# Patient Record
Sex: FEMALE | Race: BLACK OR AFRICAN AMERICAN | NOT HISPANIC OR LATINO | Employment: OTHER | URBAN - METROPOLITAN AREA
[De-identification: names, ages, dates, MRNs, and addresses within clinical notes are randomized per-mention and may not be internally consistent; named-entity substitution may affect disease eponyms.]

---

## 2023-01-27 PROBLEM — G81.11: Status: ACTIVE | Noted: 2023-01-27

## 2023-01-27 PROBLEM — A59.01 TRICHOMONAS VAGINITIS: Status: ACTIVE | Noted: 2023-01-27

## 2023-01-27 PROBLEM — I82.402 ACUTE DEEP VEIN THROMBOSIS (DVT) OF LEFT LOWER EXTREMITY (MULTI): Status: ACTIVE | Noted: 2023-01-27

## 2023-01-27 PROBLEM — R32 URINE INCONTINENCE: Status: ACTIVE | Noted: 2023-01-27

## 2023-01-27 PROBLEM — M79.673 PAIN IN FOOT: Status: ACTIVE | Noted: 2023-01-27

## 2023-01-27 PROBLEM — R14.0 ABDOMINAL DISTENSION: Status: ACTIVE | Noted: 2023-01-27

## 2023-01-27 PROBLEM — N92.0 MENORRHAGIA: Status: ACTIVE | Noted: 2023-01-27

## 2023-01-27 PROBLEM — R91.1 NODULE OF RIGHT LUNG: Status: ACTIVE | Noted: 2023-01-27

## 2023-01-27 PROBLEM — I69.359 HEMIPARESIS AFFECTING DOMINANT SIDE AS LATE EFFECT OF STROKE (MULTI): Status: ACTIVE | Noted: 2023-01-27

## 2023-01-27 PROBLEM — D69.6 THROMBOCYTOPENIA (CMS-HCC): Status: ACTIVE | Noted: 2023-01-27

## 2023-01-27 PROBLEM — I69.390 APRAXIA AS LATE EFFECT OF CEREBROVASCULAR ACCIDENT (CVA): Status: ACTIVE | Noted: 2023-01-27

## 2023-01-27 PROBLEM — Z86.73 CHRONIC ISCHEMIC LEFT MCA STROKE: Status: ACTIVE | Noted: 2023-01-27

## 2023-01-27 PROBLEM — I69.920 APHASIA DUE TO LATE EFFECTS OF CEREBROVASCULAR DISEASE: Status: ACTIVE | Noted: 2023-01-27

## 2023-01-27 PROBLEM — M25.673 DECREASED ROM OF ANKLE: Status: ACTIVE | Noted: 2023-01-27

## 2023-01-27 PROBLEM — D64.9 ANEMIA: Status: ACTIVE | Noted: 2023-01-27

## 2023-01-27 PROBLEM — I69.320 APHASIA AS LATE EFFECT OF CEREBROVASCULAR ACCIDENT (CVA): Status: ACTIVE | Noted: 2023-01-27

## 2023-01-27 PROBLEM — K21.9 GERD (GASTROESOPHAGEAL REFLUX DISEASE): Status: ACTIVE | Noted: 2023-01-27

## 2023-01-27 PROBLEM — R26.2 DIFFICULTY WALKING: Status: ACTIVE | Noted: 2023-01-27

## 2023-01-27 PROBLEM — N92.0 HEAVY MENSTRUAL BLEEDING: Status: ACTIVE | Noted: 2023-01-27

## 2023-01-27 PROBLEM — N92.0 MENORRHAGIA WITH REGULAR CYCLE: Status: ACTIVE | Noted: 2023-01-27

## 2023-01-27 PROBLEM — M62.89 MUSCLE TONE INCREASED: Status: ACTIVE | Noted: 2023-01-27

## 2023-01-27 PROBLEM — I26.99 BILATERAL PULMONARY EMBOLISM (MULTI): Status: ACTIVE | Noted: 2023-01-27

## 2023-01-27 PROBLEM — R00.2 PALPITATIONS: Status: ACTIVE | Noted: 2023-01-27

## 2023-01-27 PROBLEM — F32.A DEPRESSION: Status: ACTIVE | Noted: 2023-01-27

## 2023-01-27 PROBLEM — D21.9 FIBROIDS: Status: ACTIVE | Noted: 2023-01-27

## 2023-01-27 PROBLEM — K59.00 CONSTIPATION: Status: ACTIVE | Noted: 2023-01-27

## 2023-01-27 PROBLEM — R10.9 ABDOMINAL PAIN: Status: ACTIVE | Noted: 2023-01-27

## 2023-01-27 PROBLEM — G81.91 RIGHT HEMIPLEGIA (MULTI): Status: ACTIVE | Noted: 2023-01-27

## 2023-01-27 PROBLEM — D50.9 IRON DEFICIENCY ANEMIA: Status: ACTIVE | Noted: 2023-01-27

## 2023-01-27 PROBLEM — T74.91XA DOMESTIC ABUSE OF ADULT: Status: ACTIVE | Noted: 2023-01-27

## 2023-01-27 PROBLEM — I63.9 CRYPTOGENIC STROKE (MULTI): Status: ACTIVE | Noted: 2023-01-27

## 2023-01-27 PROBLEM — J06.9 VIRAL URI WITH COUGH: Status: ACTIVE | Noted: 2023-01-27

## 2023-01-27 RX ORDER — BACLOFEN 10 MG/1
TABLET ORAL
COMMUNITY
Start: 2022-05-27 | End: 2023-05-23 | Stop reason: SDUPTHER

## 2023-01-27 RX ORDER — FERROUS SULFATE 325(65) MG
TABLET ORAL
COMMUNITY
Start: 2019-10-01 | End: 2024-03-05 | Stop reason: WASHOUT

## 2023-01-27 RX ORDER — POLYETHYLENE GLYCOL 3350 17 G/17G
POWDER, FOR SOLUTION ORAL
COMMUNITY
Start: 2021-08-02

## 2023-01-27 RX ORDER — NAPROXEN 500 MG/1
TABLET ORAL
COMMUNITY
Start: 2021-04-16 | End: 2023-11-21 | Stop reason: WASHOUT

## 2023-01-27 RX ORDER — SERTRALINE HYDROCHLORIDE 100 MG/1
TABLET, FILM COATED ORAL
COMMUNITY
Start: 2021-03-05 | End: 2023-04-26 | Stop reason: SDUPTHER

## 2023-01-27 RX ORDER — ATORVASTATIN CALCIUM 80 MG/1
1 TABLET, FILM COATED ORAL DAILY
COMMUNITY
Start: 2017-06-05 | End: 2023-08-30 | Stop reason: SDUPTHER

## 2023-01-27 RX ORDER — ZOSTER VACCINE RECOMBINANT, ADJUVANTED 50 MCG/0.5
KIT INTRAMUSCULAR
COMMUNITY
Start: 2022-03-02 | End: 2024-03-05 | Stop reason: WASHOUT

## 2023-04-26 DIAGNOSIS — F32.A DEPRESSION, UNSPECIFIED DEPRESSION TYPE: Primary | ICD-10-CM

## 2023-05-08 RX ORDER — SERTRALINE HYDROCHLORIDE 100 MG/1
100 TABLET, FILM COATED ORAL DAILY
Qty: 90 TABLET | Refills: 0 | Status: SHIPPED | OUTPATIENT
Start: 2023-05-08 | End: 2024-03-05 | Stop reason: SDUPTHER

## 2023-05-23 ENCOUNTER — OFFICE VISIT (OUTPATIENT)
Dept: PRIMARY CARE | Facility: CLINIC | Age: 53
End: 2023-05-23
Payer: COMMERCIAL

## 2023-05-23 VITALS
HEIGHT: 66 IN | DIASTOLIC BLOOD PRESSURE: 83 MMHG | TEMPERATURE: 98.5 F | OXYGEN SATURATION: 98 % | HEART RATE: 68 BPM | WEIGHT: 144.2 LBS | SYSTOLIC BLOOD PRESSURE: 134 MMHG | BODY MASS INDEX: 23.18 KG/M2

## 2023-05-23 DIAGNOSIS — G81.11 SPASTIC HEMIPARESIS OF RIGHT DOMINANT SIDE (MULTI): Primary | ICD-10-CM

## 2023-05-23 PROCEDURE — 99214 OFFICE O/P EST MOD 30 MIN: CPT | Performed by: STUDENT IN AN ORGANIZED HEALTH CARE EDUCATION/TRAINING PROGRAM

## 2023-05-23 PROCEDURE — 1036F TOBACCO NON-USER: CPT | Performed by: STUDENT IN AN ORGANIZED HEALTH CARE EDUCATION/TRAINING PROGRAM

## 2023-05-23 RX ORDER — BACLOFEN 10 MG/1
10 TABLET ORAL 3 TIMES DAILY
Qty: 180 TABLET | Refills: 0 | Status: SHIPPED | OUTPATIENT
Start: 2023-05-23 | End: 2024-03-05 | Stop reason: WASHOUT

## 2023-05-23 NOTE — PROGRESS NOTES
ED follow up  - Seen on April 17th, 2023 for right leg pain  - Original concern for DVT but, patient has been fully compliant with anticoagulation  - Discharged with follow up with PCP  - Believed to be due to right hemiparesis  - Symptoms improved with use of baclofen  - Reports that symptoms has improved compared to ED visit   - Was previously seen In clinic for similar symptoms and was prescribed baclofen and PT which helped control her symptoms     Review of Systems  - ROS negative unless mentioned in HPI    Physical Exam  Constitutional: Well developed, awake, alert, oriented x3  Head and Face: NCAT  Eyes: Normal external exam, EOMI  ENT: Normal external inspection of ears and nose. Oropharynx normal.  Cardiovascular: RRR, S1/S2, no murmurs, rubs, or gallops, radial pulses +2, no edema of extremities  Pulmonary: CTAB, no respiratory distress.  Abdomen: +BS, soft, non-tender, nondistended, no guarding or rebound, no masses noted  Neuro: AAO x3; severely diminshed strength in the RLE and RUE. Contracture appreciated in the RLE. CN II-XIII appear grossly intact, speech limited due to previous stroke  MSK: unable to fully assess gait due to being wheelchair bound   Skin- No lesions, contusions, or erythema.  Psychiatric: Judgment intact. Appropriate mood and behavior     Assessment/Plan    52 year old F presenting to the clinic for ED follow up for right leg pain.    #RLE  - consistent with spastic hemiparesis   - Continue with baclofen 10mg TID  - Homecare referral made for home PT due to history of stroke     Plan for patient to return to clinic in 2 months for continued follow up.    Discussed with Dr. Arnulfo Prince MD PGY-3  Family Medicine   [facility]

## 2023-06-01 NOTE — PROGRESS NOTES
I saw and evaluated the patient. I personally obtained the key and critical portions of the history and physical exam or was physically present for key and critical portions performed by the resident/fellow. I reviewed the resident/fellow's documentation and discussed the patient with the resident/fellow. I agree with the resident/fellow's medical decision making as documented in the note.    Izabela Arredondo MD

## 2023-08-28 ENCOUNTER — TELEPHONE (OUTPATIENT)
Dept: PRIMARY CARE | Facility: CLINIC | Age: 53
End: 2023-08-28
Payer: COMMERCIAL

## 2023-08-28 NOTE — TELEPHONE ENCOUNTER
Rx Refill Request Telephone Encounter    Name:  Loan Osborne  :  [id number]  Medication Name:  Atorvastatin            Specific Pharmacy location:  [facility] in chart  Date of last appointment:  2023  Date of next appointment:  N/A  Best number to reach patient:  [phone]

## 2023-08-30 DIAGNOSIS — Z86.73 CHRONIC ISCHEMIC LEFT MCA STROKE: ICD-10-CM

## 2023-08-30 DIAGNOSIS — I63.9 CRYPTOGENIC STROKE (MULTI): Primary | ICD-10-CM

## 2023-08-30 RX ORDER — ATORVASTATIN CALCIUM 80 MG/1
80 TABLET, FILM COATED ORAL DAILY
Qty: 90 TABLET | Refills: 3 | Status: SHIPPED | OUTPATIENT
Start: 2023-08-30 | End: 2024-03-05 | Stop reason: SDUPTHER

## 2023-11-21 ENCOUNTER — TELEPHONE (OUTPATIENT)
Dept: PRIMARY CARE | Facility: CLINIC | Age: 53
End: 2023-11-21

## 2023-11-21 DIAGNOSIS — I82.402 ACUTE DEEP VEIN THROMBOSIS (DVT) OF LEFT LOWER EXTREMITY, UNSPECIFIED VEIN (MULTI): Primary | ICD-10-CM

## 2023-11-21 DIAGNOSIS — Z86.73 CHRONIC ISCHEMIC LEFT MCA STROKE: ICD-10-CM

## 2023-11-21 NOTE — PROGRESS NOTES
Former patient of Dr. Prince. Refilled Eliquis 5mg q12hrs for h/o stroke, PE, and continued immobility.

## 2023-12-03 ENCOUNTER — APPOINTMENT (OUTPATIENT)
Dept: RADIOLOGY | Facility: HOSPITAL | Age: 53
End: 2023-12-03
Payer: COMMERCIAL

## 2023-12-03 ENCOUNTER — ANCILLARY PROCEDURE (OUTPATIENT)
Dept: EMERGENCY MEDICINE | Facility: HOSPITAL | Age: 53
End: 2023-12-03
Payer: COMMERCIAL

## 2023-12-03 ENCOUNTER — HOSPITAL ENCOUNTER (EMERGENCY)
Facility: HOSPITAL | Age: 53
Discharge: HOME | End: 2023-12-03
Attending: STUDENT IN AN ORGANIZED HEALTH CARE EDUCATION/TRAINING PROGRAM
Payer: COMMERCIAL

## 2023-12-03 VITALS
BODY MASS INDEX: 22.6 KG/M2 | HEIGHT: 67 IN | HEART RATE: 79 BPM | SYSTOLIC BLOOD PRESSURE: 138 MMHG | RESPIRATION RATE: 16 BRPM | WEIGHT: 144 LBS | OXYGEN SATURATION: 99 % | TEMPERATURE: 98.1 F | DIASTOLIC BLOOD PRESSURE: 83 MMHG

## 2023-12-03 DIAGNOSIS — M25.561 ACUTE PAIN OF RIGHT KNEE: Primary | ICD-10-CM

## 2023-12-03 LAB
ATRIAL RATE: 96 BPM
P AXIS: 51 DEGREES
P OFFSET: 203 MS
P ONSET: 160 MS
PR INTERVAL: 120 MS
Q ONSET: 220 MS
QRS COUNT: 16 BEATS
QRS DURATION: 78 MS
QT INTERVAL: 364 MS
QTC CALCULATION(BAZETT): 459 MS
QTC FREDERICIA: 425 MS
R AXIS: 45 DEGREES
T AXIS: -17 DEGREES
T OFFSET: 402 MS
VENTRICULAR RATE: 96 BPM

## 2023-12-03 PROCEDURE — 73564 X-RAY EXAM KNEE 4 OR MORE: CPT | Mod: RT

## 2023-12-03 PROCEDURE — 70450 CT HEAD/BRAIN W/O DYE: CPT

## 2023-12-03 PROCEDURE — 72170 X-RAY EXAM OF PELVIS: CPT | Performed by: RADIOLOGY

## 2023-12-03 PROCEDURE — 71045 X-RAY EXAM CHEST 1 VIEW: CPT | Performed by: RADIOLOGY

## 2023-12-03 PROCEDURE — 93005 ELECTROCARDIOGRAM TRACING: CPT

## 2023-12-03 PROCEDURE — 72125 CT NECK SPINE W/O DYE: CPT | Performed by: RADIOLOGY

## 2023-12-03 PROCEDURE — 99285 EMERGENCY DEPT VISIT HI MDM: CPT | Performed by: STUDENT IN AN ORGANIZED HEALTH CARE EDUCATION/TRAINING PROGRAM

## 2023-12-03 PROCEDURE — 99284 EMERGENCY DEPT VISIT MOD MDM: CPT | Performed by: STUDENT IN AN ORGANIZED HEALTH CARE EDUCATION/TRAINING PROGRAM

## 2023-12-03 PROCEDURE — 71045 X-RAY EXAM CHEST 1 VIEW: CPT

## 2023-12-03 PROCEDURE — 73564 X-RAY EXAM KNEE 4 OR MORE: CPT | Mod: RIGHT SIDE | Performed by: RADIOLOGY

## 2023-12-03 PROCEDURE — 72125 CT NECK SPINE W/O DYE: CPT

## 2023-12-03 PROCEDURE — 72170 X-RAY EXAM OF PELVIS: CPT

## 2023-12-03 PROCEDURE — 70450 CT HEAD/BRAIN W/O DYE: CPT | Performed by: RADIOLOGY

## 2023-12-03 RX ORDER — ACETAMINOPHEN 325 MG/1
650 TABLET ORAL ONCE
Status: COMPLETED | OUTPATIENT
Start: 2023-12-03 | End: 2023-12-03

## 2023-12-03 RX ORDER — ACETAMINOPHEN 325 MG/1
650 TABLET ORAL EVERY 6 HOURS PRN
Qty: 30 TABLET | Refills: 0 | Status: SHIPPED | OUTPATIENT
Start: 2023-12-03 | End: 2024-03-05 | Stop reason: SDUPTHER

## 2023-12-03 RX ORDER — LIDOCAINE 50 MG/G
1 PATCH TOPICAL DAILY
Qty: 10 PATCH | Refills: 0 | Status: SHIPPED | OUTPATIENT
Start: 2023-12-03 | End: 2024-03-05 | Stop reason: WASHOUT

## 2023-12-03 RX ADMIN — ACETAMINOPHEN 650 MG: 325 TABLET ORAL at 17:41

## 2023-12-03 ASSESSMENT — LIFESTYLE VARIABLES
HAVE YOU EVER FELT YOU SHOULD CUT DOWN ON YOUR DRINKING: NO
EVER HAD A DRINK FIRST THING IN THE MORNING TO STEADY YOUR NERVES TO GET RID OF A HANGOVER: NO
HAVE PEOPLE ANNOYED YOU BY CRITICIZING YOUR DRINKING: NO
REASON UNABLE TO ASSESS: YES
EVER FELT BAD OR GUILTY ABOUT YOUR DRINKING: NO

## 2023-12-03 ASSESSMENT — COLUMBIA-SUICIDE SEVERITY RATING SCALE - C-SSRS
1. IN THE PAST MONTH, HAVE YOU WISHED YOU WERE DEAD OR WISHED YOU COULD GO TO SLEEP AND NOT WAKE UP?: NO
6. HAVE YOU EVER DONE ANYTHING, STARTED TO DO ANYTHING, OR PREPARED TO DO ANYTHING TO END YOUR LIFE?: NO
2. HAVE YOU ACTUALLY HAD ANY THOUGHTS OF KILLING YOURSELF?: NO

## 2023-12-03 ASSESSMENT — PAIN SCALES - GENERAL
PAINLEVEL_OUTOF10: 10 - WORST POSSIBLE PAIN
PAINLEVEL_OUTOF10: 0 - NO PAIN

## 2023-12-03 ASSESSMENT — PAIN - FUNCTIONAL ASSESSMENT: PAIN_FUNCTIONAL_ASSESSMENT: 0-10

## 2023-12-03 NOTE — Clinical Note
Loan Osborne was seen and treated in our emergency department on 12/3/2023.  She may return to work on 12/04/2023.       If you have any questions or concerns, please don't hesitate to call.      Nilesh Blackman MD

## 2023-12-03 NOTE — ED PROVIDER NOTES
HPI   Chief Complaint   Patient presents with    Knee Pain       HPI     Patient is a 53-year-old female with a past medical history of left-sided CVA with residual right-sided weakness, expressive aphasia, contractures, DVT/PE on Eliquis, iron deficiency anemia presenting to the emergency department with knee pain.  Patient keeps her right foot into a Aircast splint to prevent further contractures and is still ambulatory on it.  Was walking up the stairs today when she hit her right lower extremity on the stair and fell down 1 stair landing on her right side and right knee.  Patient does not believe she lost consciousness, did not hit her head.  Is not complaining of acute pain over her right knee.  Denies any other injuries, headache, vision change, hearing change, back pain, chest pain, abdominal pain, nausea, vomiting.               No data recorded                Patient History   Past Medical History:   Diagnosis Date    Benign neoplasm of connective and other soft tissue, unspecified 06/22/2021    Fibroids    Pain in unspecified foot 11/18/2022    Pain of foot, unspecified laterality    Personal history of other medical treatment 06/07/2017    History of screening mammography    Unspecified sequelae of cerebral infarction 08/12/2022    Chronic ischemic left MCA stroke     Past Surgical History:   Procedure Laterality Date    LYMPHADENECTOMY  08/13/2014    Lymphadenectomy    MR HEAD ANGIO WO IV CONTRAST  9/16/2016    MR HEAD ANGIO WO IV CONTRAST 9/16/2016 [facility]    MR NECK ANGIO W IV CONTRAST  9/16/2016    MR NECK ANGIO W IV CONTRAST 9/16/2016 [facility] CLINICAL LEGAC[facility]    TUBAL LIGATION  08/13/2014    Tubal Ligation     Family History   Problem Relation Name Age of Onset    Other ((CVA)) Mother      Hypertension Mother      Kidney cancer Father      Other ((CVA)) Other GRANDFATHER      Social History     Tobacco Use    Smoking status: Never    Smokeless tobacco: Never   Substance Use Topics    Alcohol  use: Never    Drug use: Never       Physical Exam   ED Triage Vitals [12/03/23 1603]   Temp Heart Rate Resp BP   36.3 °C (97.3 °F) 89 17 125/83      SpO2 Temp src Heart Rate Source Patient Position   95 % -- -- --      BP Location FiO2 (%)     -- --       Physical Exam  Constitutional:       Appearance: She is not toxic-appearing or diaphoretic.   HENT:      Head: Normocephalic and atraumatic.      Nose: Nose normal.   Eyes:      General: No scleral icterus.        Right eye: No discharge.         Left eye: No discharge.      Conjunctiva/sclera: Conjunctivae normal.   Cardiovascular:      Rate and Rhythm: Normal rate.      Heart sounds: No murmur heard.     No friction rub. No gallop.   Pulmonary:      Effort: No respiratory distress.      Breath sounds: Normal breath sounds.   Abdominal:      General: There is no distension.      Palpations: Abdomen is soft.   Musculoskeletal:      Cervical back: Neck supple. No rigidity.      Comments: Contractures of the right upper extremity held in flexion at the elbow and wrist, but able to be extended with effort.  2+ bilateral radial pulses.  No C/T/L-spine tenderness, facial instability, chest wall tenderness, abdominal tenderness, left or right upper extremity tenderness to palpation of the joints and long bones and hands.  Right ankle was held in a Aircast splint at approximately 90 degrees.  2+ bilateral DP pulses palpated.  Tender to palpation over the right knee without obvious swelling, abrasion, or deformity.  Otherwise nontender to palpation to the bilateral lower extremities.   Skin:     General: Skin is warm and dry.   Neurological:      Mental Status: She is alert.   Psychiatric:         Mood and Affect: Mood normal.         Behavior: Behavior normal.         ED Course & MDM   Diagnoses as of 12/03/23 2201   Acute pain of right knee       Medical Decision Making  Patient is a 53-year-old female presenting to the emergency department with knee pain.  Primary and  secondary exam were intact with the exception of some focalized tenderness over the right knee.  Given the fact the patient was on Eliquis and taken her dose this morning in the presence of her aphasia, sent patient for CT imaging of her head and C-spine that was negative for evidence of acute traumatic injury.  Patient's x-rays of her affected extremity showed no evidence of acute fracture.  Patient able to ambulate at her baseline.  Patient lives at home with a strong support system including her father had daughter who are at bedside and stated they would be able to support her.  Patient will comfortable going home.  Given lack of any evidence of acute traumatic injury, patient be discharged into the care of her family members with instructions to continue with outpatient PT/OT to prevent the risk of recurrent or future falls.  Patient will be discharged in stable condition.    Procedure  Procedures      ATTENDING NOTE for Nilesh Blackman MD:    ATTENDING ATTESTATION:  The patient was seen by the resident/fellow.  I have personally performed a substantive portion of the encounter.  I have seen and examined the patient; agree with the workup, evaluation, MDM, management and diagnosis.  The care plan has been discussed with the resident/fellow; I have reviewed the resident/fellow´s note and agree with the documented findings with the exception/addition of the following:    Patient is a 53-year-old woman with history of previous stroke with residual right-sided deficits and hypertonia which makes it difficult for her to ambulate so she wears a foot brace.  She fell down 1 or 2 stairs due to losing her balance.  Denied any dizziness or lightheadedness.  She is on Eliquis and is reporting pain only in her right knee.  She does not believe she hit her head or lost consciousness.  Patient is accompanied by family member who lives with her and his sister and reports that she is otherwise acting normally despite having  baseline aphasia.  Patient denies any pain elsewhere and when I palpate her whole body, she only has mild tenderness right over the quadricep tendon.  No tibial plateau tenderness so low suspicion for an injury there.  She has good distal pulses in the right lower extremity.  She reports she is not able to flex her knee regularly saw unable to evaluate her extensor mechanism.  We did get CT scan of her head given she is on blood thinners but no indication for CTs of the TL spine or chest and pelvis based on her reassuring physical exam.  We got x-rays of her knee as well as her chest and pelvis for screening purposes.  If imaging is negative I believe she safe for discharge and did discuss with the patient and the family that if she develops new aches or pains she does not hesitate to return especially within the chest abdomen pelvis or back given she is on an anticoagulant we did not get CTs of these areas.  Get CTs this ED stay given low clinical suspicion for injury to these areas given reassuring vitals and physical exam.  Fall seems to be mechanical.    ---------------------------------------------------------     Tk Lane MD  Resident  12/03/23 3847

## 2023-12-03 NOTE — ED TRIAGE NOTES
Pt fell an hr prior to ED arrival and c/o bilat knee pain. Pt has a hx cva and contracted on the R. Side

## 2024-03-05 ENCOUNTER — OFFICE VISIT (OUTPATIENT)
Dept: PRIMARY CARE | Facility: CLINIC | Age: 54
End: 2024-03-05
Payer: COMMERCIAL

## 2024-03-05 VITALS
HEIGHT: 64 IN | WEIGHT: 151 LBS | HEART RATE: 76 BPM | DIASTOLIC BLOOD PRESSURE: 87 MMHG | TEMPERATURE: 96.8 F | BODY MASS INDEX: 25.78 KG/M2 | SYSTOLIC BLOOD PRESSURE: 131 MMHG | OXYGEN SATURATION: 96 %

## 2024-03-05 DIAGNOSIS — Z12.31 ENCOUNTER FOR SCREENING MAMMOGRAM FOR MALIGNANT NEOPLASM OF BREAST: ICD-10-CM

## 2024-03-05 DIAGNOSIS — F32.A DEPRESSION, UNSPECIFIED DEPRESSION TYPE: ICD-10-CM

## 2024-03-05 DIAGNOSIS — Z86.73 CHRONIC ISCHEMIC LEFT MCA STROKE: ICD-10-CM

## 2024-03-05 DIAGNOSIS — M25.561 ACUTE PAIN OF RIGHT KNEE: ICD-10-CM

## 2024-03-05 DIAGNOSIS — Z00.00 HEALTH MAINTENANCE EXAMINATION: Primary | ICD-10-CM

## 2024-03-05 PROCEDURE — 1036F TOBACCO NON-USER: CPT

## 2024-03-05 PROCEDURE — 99213 OFFICE O/P EST LOW 20 MIN: CPT

## 2024-03-05 RX ORDER — SERTRALINE HYDROCHLORIDE 100 MG/1
100 TABLET, FILM COATED ORAL DAILY
Qty: 90 TABLET | Refills: 3 | Status: SHIPPED | OUTPATIENT
Start: 2024-03-05 | End: 2025-03-05

## 2024-03-05 RX ORDER — ATORVASTATIN CALCIUM 80 MG/1
80 TABLET, FILM COATED ORAL DAILY
Qty: 90 TABLET | Refills: 3 | Status: SHIPPED | OUTPATIENT
Start: 2024-03-05 | End: 2025-03-05

## 2024-03-05 RX ORDER — ACETAMINOPHEN 325 MG/1
650 TABLET ORAL EVERY 6 HOURS PRN
Qty: 30 TABLET | Refills: 0 | Status: SHIPPED | OUTPATIENT
Start: 2024-03-05 | End: 2024-04-17 | Stop reason: SDUPTHER

## 2024-03-05 RX ORDER — MULTIVIT-MIN/FERROUS GLUCONATE 9 MG/15 ML
LIQUID (ML) ORAL
COMMUNITY

## 2024-03-05 RX ORDER — BACLOFEN 5 MG/1
5 TABLET ORAL 3 TIMES DAILY
COMMUNITY
Start: 2023-04-17 | End: 2024-03-05 | Stop reason: WASHOUT

## 2024-03-05 RX ORDER — ACETAMINOPHEN AND IBUPROFEN 250; 125 MG/1; MG/1
TABLET, FILM COATED ORAL
COMMUNITY
End: 2024-03-05 | Stop reason: WASHOUT

## 2024-03-05 ASSESSMENT — ENCOUNTER SYMPTOMS
DEPRESSION: 1
DIARRHEA: 0
OCCASIONAL FEELINGS OF UNSTEADINESS: 0
FREQUENCY: 0
CONFUSION: 0
SLEEP DISTURBANCE: 0
RHINORRHEA: 0
APPETITE CHANGE: 0
CHILLS: 0
LIGHT-HEADEDNESS: 0
JOINT SWELLING: 0
HEMATURIA: 0
VOMITING: 0
WHEEZING: 0
DYSURIA: 0
LOSS OF SENSATION IN FEET: 0
HEADACHES: 0
FEVER: 0
ABDOMINAL PAIN: 0
WOUND: 0
NAUSEA: 0
PALPITATIONS: 0
DIZZINESS: 0
EYE DISCHARGE: 0
MYALGIAS: 0
UNEXPECTED WEIGHT CHANGE: 0
EYE ITCHING: 0
CONSTIPATION: 0
COUGH: 0
SHORTNESS OF BREATH: 0

## 2024-03-05 ASSESSMENT — PATIENT HEALTH QUESTIONNAIRE - PHQ9
SUM OF ALL RESPONSES TO PHQ9 QUESTIONS 1 AND 2: 0
2. FEELING DOWN, DEPRESSED OR HOPELESS: NOT AT ALL
1. LITTLE INTEREST OR PLEASURE IN DOING THINGS: NOT AT ALL

## 2024-03-05 ASSESSMENT — PAIN SCALES - GENERAL: PAINLEVEL: 0-NO PAIN

## 2024-03-05 NOTE — PROGRESS NOTES
"Subjective   Patient ID: Loan Osborne is a 53 y.o. female past medical history of left-sided CVA with residual right-sided weakness, expressive aphasia, contractures, DVT/PE on Eliquis, iron deficiency anemia who presents for Follow-up. Accompanied by Mother: Sharmaine Osborne    HPI     ED visit on 12/3/23 for fall    \"Primary and secondary exam were intact with the exception of some focalized tenderness over the right knee. Given the fact the patient was on Eliquis and taken her dose this morning in the presence of her aphasia, sent patient for CT imaging of her head and C-spine that was negative for evidence of acute traumatic injury. Patient's x-rays of her affected extremity showed no evidence of acute fracture. Patient able to ambulate at her baseline. Patient lives at home with a strong support system including her father had daughter who are at bedside and stated they would be able to support her. Patient will comfortable going home. Given lack of any evidence of acute traumatic injury, patient be discharged into the care of her family members with instructions to continue with outpatient PT/OT to prevent the risk of recurrent or future falls. Patient will be discharged in stable condition\"    PMH: as listed in HPI  PSH: none  ALL: NKDA  FH: Maternal: HTN Paternal: unknown    , both   LMP, over a year ago  Pelvic Pain: denies  Denies breast/uterine/ovarian cancers    Lives with: Two her daughters 19 YO & 13 YO. Mother and & Step dad provide meals, groceries, and take to appointments. Watching game shows  Denies tobacco/alcohol/or RD    Ambulates with quad cane    Preventive Care:   Last Mamm 2022 wnl  Last PAP  wnl  Colonoscopy 2022 repeat 7-10 yrs for surveillance    Review of Systems   Constitutional:  Negative for appetite change, chills, fever and unexpected weight change.   HENT:  Negative for congestion, ear discharge, rhinorrhea and sneezing.    Eyes:  Negative for discharge, itching " "and visual disturbance.   Respiratory:  Negative for cough, shortness of breath and wheezing.    Cardiovascular:  Negative for chest pain, palpitations and leg swelling.   Gastrointestinal:  Negative for abdominal pain, constipation, diarrhea, nausea and vomiting.   Endocrine: Negative for cold intolerance and heat intolerance.   Genitourinary:  Negative for dysuria, frequency, hematuria and urgency.   Musculoskeletal:  Negative for joint swelling and myalgias.   Skin:  Negative for rash and wound.   Neurological:  Negative for dizziness, light-headedness and headaches.   Psychiatric/Behavioral:  Negative for confusion, sleep disturbance and suicidal ideas.        Objective   /87 (BP Location: Left arm, Patient Position: Sitting)   Pulse 76   Temp 36 °C (96.8 °F)   Ht 1.626 m (5' 4\")   Wt 68.5 kg (151 lb)   SpO2 96%   BMI 25.92 kg/m²     Physical Exam  Constitutional:       General: She is not in acute distress.  HENT:      Head: Normocephalic and atraumatic.      Right Ear: Tympanic membrane and ear canal normal.      Left Ear: Tympanic membrane and ear canal normal.      Nose: Nose normal.      Mouth/Throat:      Mouth: Mucous membranes are moist.   Eyes:      Conjunctiva/sclera: Conjunctivae normal.   Cardiovascular:      Rate and Rhythm: Normal rate and regular rhythm.      Heart sounds: No murmur heard.  Pulmonary:      Effort: Pulmonary effort is normal. No respiratory distress.      Breath sounds: Normal breath sounds. No wheezing.   Abdominal:      General: There is no distension.      Palpations: Abdomen is soft.      Tenderness: There is no abdominal tenderness.   Musculoskeletal:      Right lower leg: No edema.      Left lower leg: No edema.      Comments: Right upper arm contracted. Left UE 5/5, left LE 5/5   Skin:     General: Skin is warm and dry.   Neurological:      Mental Status: She is alert.      Motor: Weakness (Right upper extremity & right lower extremity) present. "         Assessment/Plan   Loan Osborne is a 53 y.o. female past medical history of left-sided CVA with residual right-sided weakness, expressive aphasia, contractures, DVT/PE on Eliquis, iron deficiency anemia who presents for [facility].  Diagnoses and all orders for this visit:  Health maintenance examination  -     Lipid panel; Future  -     Hemoglobin A1c; Future  -     BI mammo bilateral screening tomosynthesis; Future  -     Comprehensive metabolic panel; Future  Acute pain of right knee  -     acetaminophen (TylenoL) 325 mg tablet; Take 2 tablets (650 mg) by mouth every 6 hours if needed for mild pain (1 - 3) or fever (temp greater than 38.0 C).  Chronic ischemic left MCA stroke  -     atorvastatin (Lipitor) 80 mg tablet; Take 1 tablet (80 mg) by mouth once daily.  -     Referral to Physical Therapy; Future  -     Disability Placard  Depression, unspecified depression type  -     sertraline (Zoloft) 100 mg tablet; Take 1 tablet (100 mg) by mouth once daily.  Encounter for screening mammogram for malignant neoplasm of breast  -     BI mammo bilateral screening tomosynthesis; Future  Other orders  -     Follow Up In Primary Care; Future in 2 months for PAP smear    Discussed with Dr. Kelby Vázquez,   PGY2

## 2024-03-05 NOTE — PATIENT INSTRUCTIONS
It was so great to see you today Loan Osborne  . Today we discussed:    -Get blood drawn  -Refilled medications  -Schedule mammogram and physical therapy  -Gave disability placard order for car  -Follow up in 2-3 months for PAP smear    Call [phone]  For Care if you need to schedule appointment with specialist or images.  IF any labs were ordered today, I will CALL if labs are ABNORMAL. If labs are NORMAL then I will comment on MyChart and mail results to you.    Follow up in       You were see by:    Dr. Cary Vázquez D.O.  [facility]   Phone: [phone]

## 2024-03-06 NOTE — PROGRESS NOTES
I reviewed the resident/fellow's documentation and discussed the patient with the resident/fellow. I agree with the resident/fellow's medical decision making as documented in the note.    Tabatha Lama MD

## 2024-03-14 ENCOUNTER — LAB (OUTPATIENT)
Dept: LAB | Facility: LAB | Age: 54
End: 2024-03-14
Payer: COMMERCIAL

## 2024-03-14 ENCOUNTER — HOSPITAL ENCOUNTER (OUTPATIENT)
Dept: RADIOLOGY | Facility: HOSPITAL | Age: 54
Discharge: HOME | End: 2024-03-14
Payer: COMMERCIAL

## 2024-03-14 ENCOUNTER — TELEPHONE (OUTPATIENT)
Dept: PRIMARY CARE | Facility: CLINIC | Age: 54
End: 2024-03-14

## 2024-03-14 DIAGNOSIS — Z00.00 HEALTH MAINTENANCE EXAMINATION: ICD-10-CM

## 2024-03-14 DIAGNOSIS — Z12.31 ENCOUNTER FOR SCREENING MAMMOGRAM FOR MALIGNANT NEOPLASM OF BREAST: ICD-10-CM

## 2024-03-14 LAB
ALBUMIN SERPL BCP-MCNC: 4.8 G/DL (ref 3.4–5)
ALP SERPL-CCNC: 128 U/L (ref 33–110)
ALT SERPL W P-5'-P-CCNC: 34 U/L (ref 7–45)
ANION GAP SERPL CALC-SCNC: 18 MMOL/L (ref 10–20)
AST SERPL W P-5'-P-CCNC: 20 U/L (ref 9–39)
BILIRUB SERPL-MCNC: 0.6 MG/DL (ref 0–1.2)
BUN SERPL-MCNC: 8 MG/DL (ref 6–23)
CALCIUM SERPL-MCNC: 10.3 MG/DL (ref 8.6–10.6)
CHLORIDE SERPL-SCNC: 107 MMOL/L (ref 98–107)
CHOLEST SERPL-MCNC: 230 MG/DL (ref 0–199)
CHOLESTEROL/HDL RATIO: 2
CO2 SERPL-SCNC: 24 MMOL/L (ref 21–32)
CREAT SERPL-MCNC: 0.6 MG/DL (ref 0.5–1.05)
EGFRCR SERPLBLD CKD-EPI 2021: >90 ML/MIN/1.73M*2
EST. AVERAGE GLUCOSE BLD GHB EST-MCNC: 100 MG/DL
GLUCOSE SERPL-MCNC: 84 MG/DL (ref 74–99)
HBA1C MFR BLD: 5.1 %
HDLC SERPL-MCNC: 114.7 MG/DL
LDLC SERPL CALC-MCNC: 101 MG/DL
NON HDL CHOLESTEROL: 115 MG/DL (ref 0–149)
POTASSIUM SERPL-SCNC: 3.7 MMOL/L (ref 3.5–5.3)
PROT SERPL-MCNC: 7.7 G/DL (ref 6.4–8.2)
SODIUM SERPL-SCNC: 145 MMOL/L (ref 136–145)
TRIGL SERPL-MCNC: 73 MG/DL (ref 0–149)
VLDL: 15 MG/DL (ref 0–40)

## 2024-03-14 PROCEDURE — 80053 COMPREHEN METABOLIC PANEL: CPT

## 2024-03-14 PROCEDURE — 77067 SCR MAMMO BI INCL CAD: CPT | Performed by: RADIOLOGY

## 2024-03-14 PROCEDURE — 77067 SCR MAMMO BI INCL CAD: CPT

## 2024-03-14 PROCEDURE — 77063 BREAST TOMOSYNTHESIS BI: CPT | Performed by: RADIOLOGY

## 2024-03-14 PROCEDURE — 80061 LIPID PANEL: CPT

## 2024-03-14 PROCEDURE — 36415 COLL VENOUS BLD VENIPUNCTURE: CPT

## 2024-03-14 PROCEDURE — 83036 HEMOGLOBIN GLYCOSYLATED A1C: CPT

## 2024-03-14 NOTE — TELEPHONE ENCOUNTER
PT is coming in requesting a refill of her IC Ferrous Sulfate 325mg tablet.  Says its .. Wants it sent to the pharmacy in their chart if possible.

## 2024-04-09 ENCOUNTER — APPOINTMENT (OUTPATIENT)
Dept: PHYSICAL THERAPY | Facility: HOSPITAL | Age: 54
End: 2024-04-09
Payer: COMMERCIAL

## 2024-04-17 DIAGNOSIS — M25.561 ACUTE PAIN OF RIGHT KNEE: ICD-10-CM

## 2024-04-18 DIAGNOSIS — G81.11 SPASTIC HEMIPARESIS OF RIGHT DOMINANT SIDE (MULTI): Primary | ICD-10-CM

## 2024-04-18 RX ORDER — ACETAMINOPHEN 325 MG/1
650 TABLET ORAL EVERY 6 HOURS PRN
Qty: 30 TABLET | Refills: 0 | Status: SHIPPED | OUTPATIENT
Start: 2024-04-18 | End: 2024-06-17

## 2024-04-19 ENCOUNTER — EVALUATION (OUTPATIENT)
Dept: PHYSICAL THERAPY | Facility: HOSPITAL | Age: 54
End: 2024-04-19
Payer: COMMERCIAL

## 2024-04-19 DIAGNOSIS — Z86.73 CHRONIC ISCHEMIC LEFT MCA STROKE: ICD-10-CM

## 2024-04-19 DIAGNOSIS — R53.1 RIGHT SIDED WEAKNESS: Primary | ICD-10-CM

## 2024-04-19 PROCEDURE — 97112 NEUROMUSCULAR REEDUCATION: CPT | Mod: GP

## 2024-04-19 PROCEDURE — 97162 PT EVAL MOD COMPLEX 30 MIN: CPT | Mod: GP

## 2024-04-19 RX ORDER — BACLOFEN 10 MG/1
20 TABLET ORAL 3 TIMES DAILY
Qty: 540 TABLET | Refills: 0 | Status: SHIPPED | OUTPATIENT
Start: 2024-04-19 | End: 2024-07-18

## 2024-04-19 ASSESSMENT — BALANCE ASSESSMENTS
TRANSFERS: ABLE TO TRANSFER WITH VERBAL CUEING AND/OR SUPERVISION
TURN 360 DEGREES: NEEDS ASSISTANCE WHILE TURNING
STANDING UNSUPPORTED WITH EYES CLOSED: NEEDS HELP TO KEEP FROM FALLING
PICK UP OBJECT FROM THE FLOOR FROM A STANDING POSITION: UNABLE TO PICK UP AND NEEDS SUPERVISION WHILE TRYING
PLACE ALTERNATE FOOT ON STEP OR STOOL WHILE STANDING UNSUPPORTED: NEEDS SUPERVISION WHEN TURNING
STANDING TO SITTING: SITS INDEPENDENTLY BUT HAS UNCONTROLLED DESCENT
SITTING WITH BACK UNSUPPORTED BUT FEET SUPPORTED ON FLOOR OR ON A STOOL: ABLE TO SIT 2 MINUTES UNDER SUPERVISION
REACHING FORWARD WITH OUTSTRETCHED ARM WHILE STANDING: REACHES FORWARD BUT NEEDS SUPERVISION
STANDING UNSUPPORTED WITH FEET TOGETHER: NEEDS HELP TO ATTAIN POSITION AND UNABLE TO HOLD FOR 15 SECONDS
STANDING UNSUPPORTED ONE FOOT IN FRONT: LOSES BALANCE WHILE STEPPING OR STANDING
LONG VERSION TOTAL SCORE (MAX 56): 13
STANDING TO SITTING: ABLE TO STAND USING HANDS AFTER SEVERAL TRIES
STANDING UNSUPPORTED: ABLE TO STAND 30 SECONDS UNSUPPORTED
PLACE ALTERNATE FOOT ON STEP OR STOOL WHILE STANDING UNSUPPORTED: NEEDS ASSISTANCE TO KEEP FROM FALLING/UNABLE TO TRY
STANDING ON ONE LEG: UNABLE TO TRY NEEDS ASSIST TO PREVENT FALL

## 2024-04-19 ASSESSMENT — ENCOUNTER SYMPTOMS
OCCASIONAL FEELINGS OF UNSTEADINESS: 1
LOSS OF SENSATION IN FEET: 1
DEPRESSION: 1

## 2024-04-19 ASSESSMENT — 10 METER WALK TEST (10METWT): AVERAGE: NT

## 2024-04-19 ASSESSMENT — PAIN - FUNCTIONAL ASSESSMENT: PAIN_FUNCTIONAL_ASSESSMENT: 0-10

## 2024-04-19 NOTE — PROGRESS NOTES
Physical Therapy    Physical Therapy Evaluation and Treatment      Patient Name: Loan Osborne  MRN: [id number]  Today's Date: 4/19/2024  Time Calculation  Start Time: 1100  Stop Time: 1135  Time Calculation (min): 35 min      Assessment:  PT Assessment  PT Assessment Results: Decreased strength, Decreased range of motion, Decreased endurance, Impaired balance, Decreased mobility, Decreased coordination, Decreased cognition, Impaired judgement, Decreased safety awareness, Pain  Rehab Prognosis: Poor  Assessment Comment: Patient presents to PT with R sided weakness/pain/ataxia s/p CVA in 2017. Patient has expressive aphasia, father communicates for her today. Unable to follow verbal/visual/tactile cueing well making assessment difficult. They are demonstrating ataxic movement, spastic R side UE/LE, impaired sensation in R UE/LE, poor postural awareness, impaired balance and impaired gait. This is inhibiting them from performing normal daily routines including transfers, ambulation, stair negotiation without increased pain and independently. They will benefit from skilled PT to address these impairments. Unfortunately there are not enough resources at the main [facility] location for this patient and encouraged her and her dad to call [facility] for further trearment. Patient and dad in agreement and will call there to transfer once authorization comes through.     Plan:  OP PT Plan  Treatment/Interventions: Education/ Instruction, Electrical stimulation, Gait training, Hot pack, Manual therapy, Mechanical traction, Neuromuscular re-education, Taping techniques, Therapeutic activities, Therapeutic exercises, Vasopneumatic device  PT Plan: Skilled PT  PT Frequency: 2 times per week  Duration: 8-10 weeks  Onset Date: 01/01/24  Number of Treatments Authorized: ?? ([employer] DUAL 100% COVERAGE AUTH REQ)  Rehab Potential: Poor  Plan of Care Agreement: Patient, Parent    Current Problem:   1. Right sided  "weakness        2. Chronic ischemic left MCA stroke  Referral to Physical Therapy          Subjective    General:  General  Reason for Referral: CVA R side affected  Referred By: Kelby  General Comment: Patient's Father is the historian d/t patient aphasia. Patient had stroke in 2017. She has been having R sided affects including weakness and pain in R sided. Patient has done PT in past. She went to [facility] [place]. PATIENT GOAL: get better, stronger, walk better. (Father Harlan Osborne in attendance.)    Pain:  Pain Assessment  Pain Assessment: 0-10  Pain Score:  (little bit)  Pain Orientation: Right (R Arm and Leg)  Response to Interventions: PAIN WORSE with walking  Home Living:  Home Living  Home Living Comment: House, stairs to get into house, bathroom and bedroom upstairs - walks up/down one at a time. Cane at each level of the house. \"tripod cane\" railing to get up/down (lives with 2 daughters and )  Prior Level of Function:  Prior Function Per Pt/Caregiver Report  Level of [place]: Needs assistance with homemaking  Receives Help From: Other (Comment) ( and daugher)  Vocational: Retired  Leisure: TV  Hand Dominance: Right    Objective     General Assessments:  Posture Comment: R shoulder IR, adduction, elbow flexion    Palpation Comment: numbness in R UE      Functional Assessments:  Gait Comment: using SBQC in L UE, axtaxic gait pattern, ER in R LE, R foot drop, pelvis rotated R    Extremity/Trunk Assessments:  Shoulder    Shoulder Strength  R shoulder flexion: (5/5): 2-  R shoulder abduction: (5/5): 2-  R shoulder ER: (5/5): 2-  R shoulder IR: (5/5) : 2-    , Elbow    Elbow Strength  R elbow flexion: (5/5): 2-  R elbow extension: (5/5): 2-    Elbow Special Tests    , HIP  Specific Lower Extremity MMT  R Iliopsoas: (5/5): trace  R Hip External Rotation: (5/5): 2-  L Hip External Rotation: (5/5): 3+  R knee flexion: (5/5): 2-  L knee flexion: (5/5): 3+  R knee extension: (5/5): 2-  L knee " extension: (5/5): 3+      , and ANKLE      Ankle MMT  R ankle dorsiflexion: (5/5): 0  L ankle dorsiflexion: (5/5): 3+  R ankle plantarflexion: (5/5): 0  L ankle plantarflexion: (5/5): 3+      Outcome Measures:      Mcgee Balance Scale  1. Sitting to Standing: Able to stand using hands after several tries  2. Standing Unsupported: Able to stand 30 seconds unsupported  3. Sitting with Back Unsupported but Feet Supported on Floor or on a Stool: Able to sit 2 minutes under supervision  4. Standing to Sitting: Sits independently but has uncontrolled descent  5.  Transfers: Able to transfer with verbal cueing and/or supervision  6. Standing Unsupported with Eyes Closed: Needs help to keep from falling  7. Standing Unsupported with Feet Together: Needs help to attain position and unable to hold for 15 seconds  8. Reach Forward with Outstretched Arm While Standing: Reaches forward but needs supervision  9.  Object from Floor from a Standing Position: Unable to  and needs supervision while trying  10. Turning to Look Behind Over Left and Right Shoulders While Standing: Needs supervision when turning  11. Turn 360 Degrees: Needs assistance while turning  12. Place Alternate Foot on Step or Stool While Standing Unsupported: Needs assistance to keep from falling/unable to try  13. Standing Unsupported One Foot in Front: Loses balance while stepping or standing  14. Standing on One Leg: Unable to try needs assist to prevent fall  Mcgee Balance Score: 13      Timed Up and Go Test  How many seconds did it take to complete the 5 tasks?: 45.32 seconds  TUG Comment: using SBQC in L UE, L UE to ascend/descend from chair    Other Measures  5x Sit to Stand: 58.70 seconds (L UE to ascend/descend)  6 min Walk Test: NT  10M Walk Test: NT     Treatments:  Balance/Neuromuscular Re-Education  Balance/Neuromuscular Re-Education Activity Performed: Yes  Balance/Neuromuscular Re-Education Activity 1: Access Code: [email or web address]  URL:  [email or web address]  Date: 04/19/2024  Prepared by: Florinda De La Torre    Exercises  - Sit to Stand with Counter Support  - 1 x daily - 5-7 x weekly - 2 sets - 5 reps  - Standing March with Counter Support  - 1 x daily - 5-7 x weekly - 2 sets - 10 reps  - Narrow Stance with Counter Support  - 1 x daily - 5-7 x weekly - 2 sets - 30 hold  - Side to Side Weight Shift with Counter Support  - 1 x daily - 5-7 x weekly - 2 sets - 1 min hold    EDUCATION:  Outpatient Education  Individual(s) Educated: Patient, Parent  Education Provided: Anatomy, Body Mechanics, Fall Risk, Home Exercise Program, Home Safety, Physiology, POC, Posture  Equipment:  (HEP)    Goals:  Active       chronic CVA affecting R side       Patient will demonstrate current home exercise program independently.        Start:  04/19/24    Expected End:  06/14/24            Patient will increase CONTRERAS by >/= 4 points in order to improve safety at home and decrease falls risk.         Start:  04/19/24    Expected End:  06/28/24       Scores less than 45 indicates individuals may be at increased fall risk with scores less that 40 is associated with almost 100% fall risk. (ZAIDA: Chronic 4 pts,  Acute: 6 pts)           Patient will perform 5x sit to  < 35 seconds to improve transfer mechanics.        Start:  04/19/24    Expected End:  06/28/24            Patient will improve score on Timed Up and Go by 2.9 seconds (MDC) to decrease risk of falls        Start:  04/19/24    Expected End:  06/28/24            Patient will demonstrate independent and proper use of assistive device to allow for improved walking quality and safety therefore reducing the risk of falls.       Start:  04/19/24    Expected End:  06/28/24

## 2024-04-26 ENCOUNTER — OFFICE VISIT (OUTPATIENT)
Dept: NEUROLOGY | Facility: HOSPITAL | Age: 54
End: 2024-04-26
Payer: COMMERCIAL

## 2024-04-26 ENCOUNTER — TELEPHONE (OUTPATIENT)
Dept: PRIMARY CARE | Facility: CLINIC | Age: 54
End: 2024-04-26

## 2024-04-26 VITALS
BODY MASS INDEX: 25.75 KG/M2 | HEART RATE: 64 BPM | DIASTOLIC BLOOD PRESSURE: 79 MMHG | WEIGHT: 150 LBS | RESPIRATION RATE: 19 BRPM | SYSTOLIC BLOOD PRESSURE: 117 MMHG

## 2024-04-26 DIAGNOSIS — D64.9 ANEMIA, UNSPECIFIED TYPE: ICD-10-CM

## 2024-04-26 DIAGNOSIS — I63.9 CEREBROVASCULAR ACCIDENT (CVA), UNSPECIFIED MECHANISM (MULTI): Primary | ICD-10-CM

## 2024-04-26 PROCEDURE — 99215 OFFICE O/P EST HI 40 MIN: CPT | Mod: GC | Performed by: PSYCHIATRY & NEUROLOGY

## 2024-04-26 PROCEDURE — 1036F TOBACCO NON-USER: CPT | Performed by: PSYCHIATRY & NEUROLOGY

## 2024-04-26 PROCEDURE — 99215 OFFICE O/P EST HI 40 MIN: CPT | Performed by: PSYCHIATRY & NEUROLOGY

## 2024-04-26 RX ORDER — FERROUS SULFATE 325(65) MG
325 TABLET ORAL 2 TIMES DAILY
COMMUNITY

## 2024-04-26 NOTE — PATIENT INSTRUCTIONS
Patient Discussion/Summary    Please make an appointment with Dr. Palmer at [facility] spasticity clinic for botox injections  - He sees patients on 2nd and 4th Thursday every month          Please speak to physical therapy regarding ankle brace replacement      - Dr. Mejia will place the order for it, so please call her office after you get the answer.     I can be reached at phone: [phone] or email: [email or web address]      Antithrombic Therapy/ Blood Thinners: Recommended to prevent a stroke or other vascular event - at goal   Blood Pressure: Goal is less than 140/90 mmHg,~- at goal, continue current plan   Cholesterol: - at goal, continue current plan~will check again.   Blood sugar: Goal is normal fasting sugar between  mg/dl   Smoking: Goal is not to smoke and avoid second-hand smoke - at goal   Physical Activity: Goal is to improve physical fitness by exercising at a moderate level for at least 30 minutes most days of the week - not at goal   I am increasing the baclofen to 2 pills twice a day. I would like her to see Dr Fiorella Barboza [phone], she can help with the stiffness.  .   Stroke prevention:  Eat a healthy diet with plenty of fresh fruits and vegetables. Avoid salt and fried foods.  Lose weight and exercise on a regular basis.  Do not smoke or use drugs.  Be sure to take all medications.  Call 911 for signs of stroke (numbness or weakness on one side, trouble seeing on one side, trouble speaking (either because your speech is slurred or you can't find the words), sudden double vision, spinning sensation or loss of coordination).

## 2024-04-26 NOTE — PROGRESS NOTES
Provider Impressions  1. Left middle cerebral artery stroke: cryptogenic. Eliquis is for the pulmonary embolus and deep vein thrombosis. Given her immobility we will continue it for the stroke and deep vein thrombosis/ pulmonary embolus history. Continue statin.     2. hemiparesis: increase baclofen to 20mg TID;   - Spoke to her about the benefit of botox and she is agreeable to try it refer to Dr [facility] Lugo spasticity clinic for possible botox.     3. Not getting her periods anymore, will hold off on ferrous sulfate    4. Referral to physical therapy regarding Ankle Brace      Follow-up annually and prn.       Patient discussed and seen by attending physician Dr. Mejia who agrees with the plan above.     Munir Graham MD  Neurology Resident PGY-2             Current Outpatient Medications on File Prior to Visit   Medication Sig Dispense Refill    apixaban (Eliquis) 5 mg tablet Take 1 tablet (5 mg) by mouth every 12 hours. 60 tablet 5    atorvastatin (Lipitor) 80 mg tablet Take 1 tablet (80 mg) by mouth once daily. 90 tablet 3    baclofen (Lioresal) 10 mg tablet Take 2 tablets (20 mg) by mouth 3 times a day. 540 tablet 0    ferrous sulfate, 325 mg ferrous sulfate, tablet Take 1 tablet by mouth 2 times a day.      sertraline (Zoloft) 100 mg tablet Take 1 tablet (100 mg) by mouth once daily. 90 tablet 3    acetaminophen (TylenoL) 325 mg tablet Take 2 tablets (650 mg) by mouth every 6 hours if needed for mild pain (1 - 3) or fever (temp greater than 38.0 C). (Patient not taking: Reported on 4/26/2024) 30 tablet 0    multivitamin with iron-minerals (Centrum) 9 mg iron/15 mL liquid Take by mouth.      polyethylene glycol (Miralax) 17 gram/dose powder MIX 1 PACKET IN 8 OUNCES OF LIQUID AND DRINK ONCE DAILY.       No current facility-administered medications on file prior to visit.       Vitals:    04/26/24 1218   BP: 117/79   Pulse: 64   Resp: 19       GENERAL APPEARANCE:  No distress, sitting in wheelchair,  alert, interactive and cooperative.       MENTAL STATE:   Awake and alert, mental exam limited by sever mixed aphasia, can say simple one words like (yeah and no), she wasn't able to name any objects or repeat any sentence. can follow simple commands like lift up the left arm or leg. Had difficulty with comprehending more complex commands.     CRANIAL NERVES:   CN 2   Blink to threat showed R homonymous hemianopsia  CN 3, 4, 6   Pupils round, 4 mm in diameter, equally reactive to light. Lids symmetric; no ptosis. EOMs normal alignment, full range with normal saccades, pursuit and convergence. No nystagmus.   CN 7   Normal and symmetric facial strength. Nasolabial folds symmetric.   CN 8   Hearing intact to finger rub.  CN 9   Palate elevates symmetrically.   CN 11   Normal strength of shoulder shrug and neck turning.   CN 12   Tongue midline, with normal bulk and strength; no fasciculations.     MOTOR:   Increased muscle spasticity in the right arm and leg. Right hand held in tight fist, examiner was unable to open it due to pain.   Muscle tone normal on the left side                    R       L  Delt          0       5  Bicep        0       5  Tricep       0       5             0       5    Hip Flex     3       5  Leg Ext     3       5  Leg Flex    3      5    REFLEXES:     RIGHT UE   LEFT UE   BR:3        BR:2     Biceps:3      Biceps:2     Triceps:3     Triceps:2       RIGHT LLE   LEFT LLE     Knee:2         Knee:2         SENSORY:   Reduced sensation on the right side    COORDINATION:    Unable to fully attempt due to difficulty with following commands    GAIT:   Deferred      1a. Level of Consciousness: alert; keenly responsive.   1b. LOC Question: answers neither question correctly.   1c. LOC Commands: obeys both commands.   2. Best gaze normal gaze.   3. Visual Field: complete hemianopia.   4. Facial Palsy: displays minor facial paralysis.   5. Left Motor Arm: displays no LUE drift; .   5b. Right Motor  Arm: displays no RUE movement.   6a. Left Motor Leg: displays no LLE drift.   6b. Right Motor Leg: displays some effort against gravity RLE.   7. Limb Ataxia: no limb ataxia.   8. Sensory: mild to moderate sensory loss.   9. Best Language: exhibits severe aphasia.   10. Dysarthria: normal speech.   11.Inattention/Extinction: no extinction/inattention abnormality or neglect.   NIH Stroke Scale Score:. 14     History of Present Illness     The patient is being seen for a follow-up.   Stroke Subtype(s): ischemic stroke: cryptogenic .   53 year old woman with unremarkable past history who suffered a cryptogenic left middle cerebral artery infarct on Sept 16, 2016 when her  heard her fall out of bed. She had a right hemiplegia and was unable to speak. She already had a large infarct on CT and a left M1 occlusion. She was treated with hypertonic saline. Course was complicated by thrombocytopenia thought to be related to heparin use. Antiplatelet factor testing was negative. She remains on anticoagulation.     Review of stroke information:  Onset Sept 16, 2016   Stroke type: L MCA, L M1 occlusion. no tPA or endovascular therapy. hypertonic therapy, no hemicrani  MRA: No carotid stenosis   TTE and DESTIN: no cardioembolic source, EF 65-70%, normal left atrium size, no intra-cardiac thrombus, no patent foramen ovale.   Drug screen - positive only for cannabinoids.   Hemoglobin A1c was 5.4.   Lipid panel showed a total cholesterol 217, HDL 90, ratio 2.4, , VLDL 15 triglycerides of 76.   Anticardiolipin antibody, antinuclear antibody, beta-2 glycoprotein DRVVT - all normal.   Was not iron deficient at the time of the stroke.  Ziopatch - took many attempts to obtain; did not show atrial fibrillation   Meds: Eliquis, atorvastatin     Since her initial hospitalization she has continued to have severe aphasia and hemiparesis. Her course was complicated by a DVT for which she was placed on a DOAC. Mother is very involved  and she has done a lot of therapy over the years including studies at [facility] but she is not currently interested in more therapy.          Interval history:     No new stroke symptoms. Planning on stopping PT, OT and speech given plateau in improvement in the last few years.  Did not go to spasticity clinic appointment as she  was sure about Botox helping with spasticity. We spoke with her and her mother today and explained that it would reduce the pain associated with spasticity. She continues to take baclofen 20 mg TID.     Her right AFO broke a few months ago, her father is wondering if we can order a new one.   She ran out of Ferrous sulfate.   Continues to Live with  and children, requires help with showering and going to restroom. Can eat on her own.

## 2024-04-26 NOTE — TELEPHONE ENCOUNTER
Patient came into the office today and requested for IC ferr to be refilled please give the patient a callback about this matter

## 2024-05-09 ENCOUNTER — APPOINTMENT (OUTPATIENT)
Dept: PHYSICAL THERAPY | Facility: CLINIC | Age: 54
End: 2024-05-09

## 2024-05-16 ENCOUNTER — DOCUMENTATION (OUTPATIENT)
Dept: PHYSICAL THERAPY | Facility: CLINIC | Age: 54
End: 2024-05-16
Payer: COMMERCIAL

## 2024-05-16 NOTE — PROGRESS NOTES
Physical Therapy                 Therapy Communication Note    Patient Name: Loan Osborne  MRN: [id number]  Today's Date: 5/16/2024     Discipline: Physical Therapy    Missed Visit Reason:   Pt was a no call and no show    Missed Time: No Show    Comment:

## 2024-05-23 ENCOUNTER — OFFICE VISIT (OUTPATIENT)
Dept: PRIMARY CARE | Facility: CLINIC | Age: 54
End: 2024-05-23
Payer: COMMERCIAL

## 2024-05-23 ENCOUNTER — DOCUMENTATION (OUTPATIENT)
Dept: HOME HEALTH SERVICES | Facility: HOME HEALTH | Age: 54
End: 2024-05-23

## 2024-05-23 ENCOUNTER — HOME HEALTH ADMISSION (OUTPATIENT)
Dept: HOME HEALTH SERVICES | Facility: HOME HEALTH | Age: 54
End: 2024-05-23
Payer: COMMERCIAL

## 2024-05-23 VITALS
OXYGEN SATURATION: 96 % | BODY MASS INDEX: 24.75 KG/M2 | HEIGHT: 66 IN | WEIGHT: 154 LBS | SYSTOLIC BLOOD PRESSURE: 133 MMHG | TEMPERATURE: 98.6 F | HEART RATE: 92 BPM | DIASTOLIC BLOOD PRESSURE: 86 MMHG

## 2024-05-23 DIAGNOSIS — Z11.59 NEED FOR HEPATITIS C SCREENING TEST: ICD-10-CM

## 2024-05-23 DIAGNOSIS — Z12.4 PAP SMEAR FOR CERVICAL CANCER SCREENING: ICD-10-CM

## 2024-05-23 DIAGNOSIS — Z11.4 SCREENING FOR HUMAN IMMUNODEFICIENCY VIRUS: ICD-10-CM

## 2024-05-23 DIAGNOSIS — Z86.73 CHRONIC ISCHEMIC LEFT MCA STROKE: Primary | ICD-10-CM

## 2024-05-23 DIAGNOSIS — Z00.00 HEALTH MAINTENANCE EXAMINATION: Primary | ICD-10-CM

## 2024-05-23 PROCEDURE — 1036F TOBACCO NON-USER: CPT

## 2024-05-23 PROCEDURE — 99213 OFFICE O/P EST LOW 20 MIN: CPT

## 2024-05-23 PROCEDURE — 87661 TRICHOMONAS VAGINALIS AMPLIF: CPT

## 2024-05-23 PROCEDURE — 87591 N.GONORRHOEAE DNA AMP PROB: CPT

## 2024-05-23 PROCEDURE — 87491 CHLMYD TRACH DNA AMP PROBE: CPT

## 2024-05-23 PROCEDURE — 88175 CYTOPATH C/V AUTO FLUID REDO: CPT

## 2024-05-23 PROCEDURE — 87624 HPV HI-RISK TYP POOLED RSLT: CPT

## 2024-05-23 ASSESSMENT — ENCOUNTER SYMPTOMS
HEMATURIA: 0
DEPRESSION: 0
TROUBLE SWALLOWING: 0
VOMITING: 0
DYSURIA: 0
BLOOD IN STOOL: 0
COLOR CHANGE: 0
VOICE CHANGE: 0
APPETITE CHANGE: 0
DIARRHEA: 0
CONSTIPATION: 0
FEVER: 0

## 2024-05-23 ASSESSMENT — PATIENT HEALTH QUESTIONNAIRE - PHQ9
1. LITTLE INTEREST OR PLEASURE IN DOING THINGS: NOT AT ALL
SUM OF ALL RESPONSES TO PHQ9 QUESTIONS 1 AND 2: 0
2. FEELING DOWN, DEPRESSED OR HOPELESS: NOT AT ALL

## 2024-05-23 ASSESSMENT — PAIN SCALES - GENERAL: PAINLEVEL: 0-NO PAIN

## 2024-05-23 NOTE — HH CARE COORDINATION
Home Care received a Referral for Physical Therapy, Occupational Therapy, and Home Health Aide. We have processed the referral for a Start of Care on 5/25/24.     If you have any questions or concerns, please feel free to contact us at [phone]. Follow the prompts, enter your five digit zip code, and you will be directed to your care team on [facility] 1.
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
## 2024-05-24 LAB
C TRACH RRNA SPEC QL NAA+PROBE: NEGATIVE
N GONORRHOEA DNA SPEC QL PROBE+SIG AMP: NEGATIVE
T VAGINALIS RRNA SPEC QL NAA+PROBE: NEGATIVE

## 2024-06-05 ENCOUNTER — APPOINTMENT (OUTPATIENT)
Dept: PHYSICAL THERAPY | Facility: HOSPITAL | Age: 54
End: 2024-06-05

## 2024-06-07 ENCOUNTER — TELEPHONE (OUTPATIENT)
Dept: NEUROLOGY | Facility: HOSPITAL | Age: 54
End: 2024-06-07

## 2024-06-07 DIAGNOSIS — D64.9 ANEMIA, UNSPECIFIED TYPE: Primary | ICD-10-CM

## 2024-06-07 LAB
CYTOLOGY CMNT CVX/VAG CYTO-IMP: NORMAL
HPV HR 12 DNA GENITAL QL NAA+PROBE: NEGATIVE
HPV HR GENOTYPES PNL CVX NAA+PROBE: NEGATIVE
HPV16 DNA SPEC QL NAA+PROBE: NEGATIVE
HPV18 DNA SPEC QL NAA+PROBE: NEGATIVE
LAB AP HPV GENOTYPE QUESTION: YES
LAB AP HPV HR: NORMAL
LAB AP PAP ADDITIONAL TESTS: NORMAL
LABORATORY COMMENT REPORT: NORMAL
MENSTRUAL HX REPORTED: NORMAL
PATH REPORT.TOTAL CANCER: NORMAL

## 2024-06-12 RX ORDER — FERROUS SULFATE 325(65) MG
325 TABLET ORAL
Qty: 90 TABLET | Refills: 3 | Status: SHIPPED | OUTPATIENT
Start: 2024-06-12 | End: 2025-06-12

## 2024-07-18 ENCOUNTER — DOCUMENTATION (OUTPATIENT)
Dept: PHYSICAL THERAPY | Facility: HOSPITAL | Age: 54
End: 2024-07-18
Payer: COMMERCIAL

## 2024-07-18 NOTE — PROGRESS NOTES
Physical Therapy    Discharge Summary    Name: Loan Osborne  MRN: [id number]  : 1970  Date: 24    Discharge Summary: PT    Discharge Information: Date of discharge 24, Date of last visit 24, Date of evaluation 24, Number of attended visits 1, Referred by [place], and Referred for Chronic L MCA CVA with R hemiparesis     Rehab Discharge Reason: Failed to schedule and/or keep follow-up appointment(s)

## 2024-08-06 ENCOUNTER — TELEPHONE (OUTPATIENT)
Dept: NEUROLOGY | Facility: HOSPITAL | Age: 54
End: 2024-08-06

## 2024-08-06 DIAGNOSIS — I82.402 ACUTE DEEP VEIN THROMBOSIS (DVT) OF LEFT LOWER EXTREMITY, UNSPECIFIED VEIN (MULTI): ICD-10-CM

## 2024-09-13 DIAGNOSIS — G81.11 SPASTIC HEMIPARESIS OF RIGHT DOMINANT SIDE (MULTI): ICD-10-CM

## 2024-09-14 RX ORDER — BACLOFEN 10 MG/1
20 TABLET ORAL 3 TIMES DAILY
Qty: 540 TABLET | Refills: 1 | Status: SHIPPED | OUTPATIENT
Start: 2024-09-14 | End: 2025-03-13

## 2024-11-01 ENCOUNTER — OFFICE VISIT (OUTPATIENT)
Dept: NEUROLOGY | Facility: HOSPITAL | Age: 54
End: 2024-11-01
Payer: COMMERCIAL

## 2024-11-01 VITALS
HEART RATE: 90 BPM | SYSTOLIC BLOOD PRESSURE: 128 MMHG | DIASTOLIC BLOOD PRESSURE: 82 MMHG | BODY MASS INDEX: 24.86 KG/M2 | RESPIRATION RATE: 18 BRPM | WEIGHT: 154 LBS

## 2024-11-01 DIAGNOSIS — G81.10 SPASTIC HEMIPARESIS (MULTI): Primary | ICD-10-CM

## 2024-11-01 PROCEDURE — 99214 OFFICE O/P EST MOD 30 MIN: CPT | Performed by: PSYCHIATRY & NEUROLOGY

## 2024-11-01 PROCEDURE — 1036F TOBACCO NON-USER: CPT | Performed by: PSYCHIATRY & NEUROLOGY

## 2024-11-01 PROCEDURE — 99214 OFFICE O/P EST MOD 30 MIN: CPT | Mod: GC | Performed by: PSYCHIATRY & NEUROLOGY

## 2024-12-11 ENCOUNTER — APPOINTMENT (OUTPATIENT)
Dept: RADIOLOGY | Facility: HOSPITAL | Age: 54
DRG: 300 | End: 2024-12-11
Payer: COMMERCIAL

## 2024-12-11 ENCOUNTER — HOSPITAL ENCOUNTER (INPATIENT)
Facility: HOSPITAL | Age: 54
DRG: 300 | End: 2024-12-11
Attending: EMERGENCY MEDICINE | Admitting: STUDENT IN AN ORGANIZED HEALTH CARE EDUCATION/TRAINING PROGRAM
Payer: COMMERCIAL

## 2024-12-11 DIAGNOSIS — R53.1 RIGHT SIDED WEAKNESS: ICD-10-CM

## 2024-12-11 DIAGNOSIS — I82.402 ACUTE DEEP VEIN THROMBOSIS (DVT) OF LEFT LOWER EXTREMITY, UNSPECIFIED VEIN (MULTI): ICD-10-CM

## 2024-12-11 DIAGNOSIS — D64.9 ANEMIA, UNSPECIFIED TYPE: ICD-10-CM

## 2024-12-11 DIAGNOSIS — R33.9 URINARY RETENTION: ICD-10-CM

## 2024-12-11 DIAGNOSIS — R32 URINARY INCONTINENCE, UNSPECIFIED TYPE: ICD-10-CM

## 2024-12-11 DIAGNOSIS — I70.401: ICD-10-CM

## 2024-12-11 DIAGNOSIS — G81.11 SPASTIC HEMIPARESIS OF RIGHT DOMINANT SIDE (MULTI): ICD-10-CM

## 2024-12-11 DIAGNOSIS — K59.00 CONSTIPATION, UNSPECIFIED CONSTIPATION TYPE: ICD-10-CM

## 2024-12-11 DIAGNOSIS — I82.413 ACUTE DEEP VEIN THROMBOSIS (DVT) OF FEMORAL VEIN OF BOTH LOWER EXTREMITIES (MULTI): Primary | ICD-10-CM

## 2024-12-11 DIAGNOSIS — Z86.73 CHRONIC ISCHEMIC LEFT MCA STROKE: ICD-10-CM

## 2024-12-11 DIAGNOSIS — M79.673 PAIN OF FOOT, UNSPECIFIED LATERALITY: ICD-10-CM

## 2024-12-11 PROCEDURE — 73560 X-RAY EXAM OF KNEE 1 OR 2: CPT | Mod: RIGHT SIDE | Performed by: RADIOLOGY

## 2024-12-11 PROCEDURE — 73552 X-RAY EXAM OF FEMUR 2/>: CPT | Mod: RIGHT SIDE | Performed by: RADIOLOGY

## 2024-12-11 PROCEDURE — 73620 X-RAY EXAM OF FOOT: CPT | Mod: RT

## 2024-12-11 PROCEDURE — 99285 EMERGENCY DEPT VISIT HI MDM: CPT | Mod: 25 | Performed by: EMERGENCY MEDICINE

## 2024-12-11 PROCEDURE — 96372 THER/PROPH/DIAG INJ SC/IM: CPT

## 2024-12-11 PROCEDURE — 93971 EXTREMITY STUDY: CPT | Performed by: RADIOLOGY

## 2024-12-11 PROCEDURE — 99285 EMERGENCY DEPT VISIT HI MDM: CPT | Performed by: EMERGENCY MEDICINE

## 2024-12-11 PROCEDURE — 73502 X-RAY EXAM HIP UNI 2-3 VIEWS: CPT | Mod: RT

## 2024-12-11 PROCEDURE — 73502 X-RAY EXAM HIP UNI 2-3 VIEWS: CPT | Mod: RIGHT SIDE | Performed by: RADIOLOGY

## 2024-12-11 PROCEDURE — 73590 X-RAY EXAM OF LOWER LEG: CPT | Mod: RT

## 2024-12-11 PROCEDURE — 73620 X-RAY EXAM OF FOOT: CPT | Mod: RIGHT SIDE | Performed by: RADIOLOGY

## 2024-12-11 PROCEDURE — 73560 X-RAY EXAM OF KNEE 1 OR 2: CPT | Mod: RT

## 2024-12-11 PROCEDURE — 93971 EXTREMITY STUDY: CPT

## 2024-12-11 PROCEDURE — 2500000004 HC RX 250 GENERAL PHARMACY W/ HCPCS (ALT 636 FOR OP/ED)

## 2024-12-11 PROCEDURE — 73600 X-RAY EXAM OF ANKLE: CPT | Mod: RIGHT SIDE | Performed by: RADIOLOGY

## 2024-12-11 PROCEDURE — 73552 X-RAY EXAM OF FEMUR 2/>: CPT | Mod: RT

## 2024-12-11 PROCEDURE — 93010 ELECTROCARDIOGRAM REPORT: CPT | Performed by: EMERGENCY MEDICINE

## 2024-12-11 PROCEDURE — 73600 X-RAY EXAM OF ANKLE: CPT | Mod: RT

## 2024-12-11 PROCEDURE — 73590 X-RAY EXAM OF LOWER LEG: CPT | Mod: RIGHT SIDE | Performed by: RADIOLOGY

## 2024-12-11 RX ORDER — KETOROLAC TROMETHAMINE 15 MG/ML
15 INJECTION, SOLUTION INTRAMUSCULAR; INTRAVENOUS ONCE
Status: COMPLETED | OUTPATIENT
Start: 2024-12-11 | End: 2024-12-11

## 2024-12-11 RX ORDER — HYDROMORPHONE HYDROCHLORIDE 1 MG/ML
1 INJECTION, SOLUTION INTRAMUSCULAR; INTRAVENOUS; SUBCUTANEOUS ONCE
Status: COMPLETED | OUTPATIENT
Start: 2024-12-11 | End: 2024-12-11

## 2024-12-11 RX ORDER — HYDROMORPHONE HYDROCHLORIDE 1 MG/ML
1 INJECTION, SOLUTION INTRAMUSCULAR; INTRAVENOUS; SUBCUTANEOUS ONCE
Status: DISCONTINUED | OUTPATIENT
Start: 2024-12-11 | End: 2024-12-11

## 2024-12-11 RX ORDER — ONDANSETRON HYDROCHLORIDE 2 MG/ML
4 INJECTION, SOLUTION INTRAVENOUS ONCE
Status: COMPLETED | OUTPATIENT
Start: 2024-12-11 | End: 2024-12-11

## 2024-12-11 RX ORDER — ONDANSETRON HYDROCHLORIDE 2 MG/ML
INJECTION, SOLUTION INTRAVENOUS
Status: COMPLETED
Start: 2024-12-11 | End: 2024-12-11

## 2024-12-11 RX ORDER — KETOROLAC TROMETHAMINE 15 MG/ML
15 INJECTION, SOLUTION INTRAMUSCULAR; INTRAVENOUS ONCE
Status: DISCONTINUED | OUTPATIENT
Start: 2024-12-11 | End: 2024-12-11

## 2024-12-11 ASSESSMENT — LIFESTYLE VARIABLES
TOTAL SCORE: 0
HAVE PEOPLE ANNOYED YOU BY CRITICIZING YOUR DRINKING: NO
HAVE YOU EVER FELT YOU SHOULD CUT DOWN ON YOUR DRINKING: NO
EVER FELT BAD OR GUILTY ABOUT YOUR DRINKING: NO
EVER HAD A DRINK FIRST THING IN THE MORNING TO STEADY YOUR NERVES TO GET RID OF A HANGOVER: NO

## 2024-12-11 ASSESSMENT — COLUMBIA-SUICIDE SEVERITY RATING SCALE - C-SSRS
6. HAVE YOU EVER DONE ANYTHING, STARTED TO DO ANYTHING, OR PREPARED TO DO ANYTHING TO END YOUR LIFE?: NO
1. IN THE PAST MONTH, HAVE YOU WISHED YOU WERE DEAD OR WISHED YOU COULD GO TO SLEEP AND NOT WAKE UP?: NO
2. HAVE YOU ACTUALLY HAD ANY THOUGHTS OF KILLING YOURSELF?: NO

## 2024-12-11 ASSESSMENT — PAIN DESCRIPTION - LOCATION: LOCATION: LEG

## 2024-12-11 ASSESSMENT — PAIN - FUNCTIONAL ASSESSMENT: PAIN_FUNCTIONAL_ASSESSMENT: 0-10

## 2024-12-11 ASSESSMENT — PAIN SCALES - GENERAL: PAINLEVEL_OUTOF10: 10 - WORST POSSIBLE PAIN

## 2024-12-12 ENCOUNTER — APPOINTMENT (OUTPATIENT)
Dept: RADIOLOGY | Facility: HOSPITAL | Age: 54
DRG: 300 | End: 2024-12-12
Payer: COMMERCIAL

## 2024-12-12 PROBLEM — I82.413 ACUTE DEEP VEIN THROMBOSIS (DVT) OF FEMORAL VEIN OF BOTH LOWER EXTREMITIES (MULTI): Status: ACTIVE | Noted: 2024-12-12

## 2024-12-12 LAB
ABO GROUP (TYPE) IN BLOOD: NORMAL
ALBUMIN SERPL BCP-MCNC: 4.4 G/DL (ref 3.4–5)
ALBUMIN SERPL BCP-MCNC: 4.4 G/DL (ref 3.4–5)
ALP SERPL-CCNC: 134 U/L (ref 33–110)
ALP SERPL-CCNC: 136 U/L (ref 33–110)
ALT SERPL W P-5'-P-CCNC: 53 U/L (ref 7–45)
ALT SERPL W P-5'-P-CCNC: 55 U/L (ref 7–45)
ANION GAP SERPL CALC-SCNC: 17 MMOL/L (ref 10–20)
ANION GAP SERPL CALC-SCNC: 24 MMOL/L (ref 10–20)
ANTIBODY SCREEN: NORMAL
APPEARANCE UR: CLEAR
APTT PPP: 29 SECONDS (ref 27–38)
APTT PPP: >200 SECONDS (ref 27–38)
AST SERPL W P-5'-P-CCNC: 41 U/L (ref 9–39)
AST SERPL W P-5'-P-CCNC: 47 U/L (ref 9–39)
B-HCG SERPL-ACNC: 3 MIU/ML
B2 GLYCOPROT1 IGA SER-ACNC: <0.6 U/ML
B2 GLYCOPROT1 IGG SER-ACNC: <1.4 U/ML
B2 GLYCOPROT1 IGM SER-ACNC: 2.4 U/ML
BACTERIA #/AREA URNS AUTO: ABNORMAL /HPF
BASOPHILS # BLD AUTO: 0.04 X10*3/UL (ref 0–0.1)
BASOPHILS # BLD AUTO: 0.05 X10*3/UL (ref 0–0.1)
BASOPHILS NFR BLD AUTO: 0.6 %
BASOPHILS NFR BLD AUTO: 0.7 %
BILIRUB DIRECT SERPL-MCNC: 0.1 MG/DL (ref 0–0.3)
BILIRUB SERPL-MCNC: 0.5 MG/DL (ref 0–1.2)
BILIRUB SERPL-MCNC: 0.7 MG/DL (ref 0–1.2)
BILIRUB UR STRIP.AUTO-MCNC: NEGATIVE MG/DL
BUN SERPL-MCNC: 7 MG/DL (ref 6–23)
BUN SERPL-MCNC: 7 MG/DL (ref 6–23)
CALCIUM SERPL-MCNC: 9.4 MG/DL (ref 8.6–10.6)
CALCIUM SERPL-MCNC: 9.5 MG/DL (ref 8.6–10.6)
CARDIOLIPIN IGA SERPL-ACNC: <0.5 APL U/ML
CARDIOLIPIN IGG SER IA-ACNC: <1.6 GPL U/ML
CARDIOLIPIN IGM SER IA-ACNC: 0.7 MPL U/ML
CHLORIDE SERPL-SCNC: 106 MMOL/L (ref 98–107)
CHLORIDE SERPL-SCNC: 107 MMOL/L (ref 98–107)
CHOLEST SERPL-MCNC: 239 MG/DL (ref 0–199)
CHOLESTEROL/HDL RATIO: 2
CO2 SERPL-SCNC: 17 MMOL/L (ref 21–32)
CO2 SERPL-SCNC: 23 MMOL/L (ref 21–32)
COLOR UR: ABNORMAL
CREAT SERPL-MCNC: 0.48 MG/DL (ref 0.5–1.05)
CREAT SERPL-MCNC: 0.48 MG/DL (ref 0.5–1.05)
EGFRCR SERPLBLD CKD-EPI 2021: >90 ML/MIN/1.73M*2
EGFRCR SERPLBLD CKD-EPI 2021: >90 ML/MIN/1.73M*2
EOSINOPHIL # BLD AUTO: 0.02 X10*3/UL (ref 0–0.7)
EOSINOPHIL # BLD AUTO: 0.04 X10*3/UL (ref 0–0.7)
EOSINOPHIL NFR BLD AUTO: 0.2 %
EOSINOPHIL NFR BLD AUTO: 0.7 %
ERYTHROCYTE [DISTWIDTH] IN BLOOD BY AUTOMATED COUNT: 13.5 % (ref 11.5–14.5)
ERYTHROCYTE [DISTWIDTH] IN BLOOD BY AUTOMATED COUNT: 13.5 % (ref 11.5–14.5)
EST. AVERAGE GLUCOSE BLD GHB EST-MCNC: 103 MG/DL
GLUCOSE SERPL-MCNC: 131 MG/DL (ref 74–99)
GLUCOSE SERPL-MCNC: 79 MG/DL (ref 74–99)
GLUCOSE UR STRIP.AUTO-MCNC: NORMAL MG/DL
HBA1C MFR BLD: 5.2 %
HBV SURFACE AB SER-ACNC: <3.1 MIU/ML
HBV SURFACE AG SERPL QL IA: NONREACTIVE
HCT VFR BLD AUTO: 39.7 % (ref 36–46)
HCT VFR BLD AUTO: 40.7 % (ref 36–46)
HCV AB SER QL: NONREACTIVE
HDLC SERPL-MCNC: 120.7 MG/DL
HGB BLD-MCNC: 14.4 G/DL (ref 12–16)
HGB BLD-MCNC: 14.9 G/DL (ref 12–16)
HOLD SPECIMEN: NORMAL
IMM GRANULOCYTES # BLD AUTO: 0.02 X10*3/UL (ref 0–0.7)
IMM GRANULOCYTES # BLD AUTO: 0.02 X10*3/UL (ref 0–0.7)
IMM GRANULOCYTES NFR BLD AUTO: 0.2 % (ref 0–0.9)
IMM GRANULOCYTES NFR BLD AUTO: 0.3 % (ref 0–0.9)
INR PPP: 1.2 (ref 0.9–1.1)
INR PPP: 1.2 (ref 0.9–1.1)
KETONES UR STRIP.AUTO-MCNC: NEGATIVE MG/DL
LDLC SERPL CALC-MCNC: 82 MG/DL
LEUKOCYTE ESTERASE UR QL STRIP.AUTO: ABNORMAL
LIPASE SERPL-CCNC: 22 U/L (ref 9–82)
LYMPHOCYTES # BLD AUTO: 1.33 X10*3/UL (ref 1.2–4.8)
LYMPHOCYTES # BLD AUTO: 1.71 X10*3/UL (ref 1.2–4.8)
LYMPHOCYTES NFR BLD AUTO: 15.8 %
LYMPHOCYTES NFR BLD AUTO: 29.1 %
MAGNESIUM SERPL-MCNC: 2.06 MG/DL (ref 1.6–2.4)
MCH RBC QN AUTO: 31.2 PG (ref 26–34)
MCH RBC QN AUTO: 31.2 PG (ref 26–34)
MCHC RBC AUTO-ENTMCNC: 36.3 G/DL (ref 32–36)
MCHC RBC AUTO-ENTMCNC: 36.6 G/DL (ref 32–36)
MCV RBC AUTO: 85 FL (ref 80–100)
MCV RBC AUTO: 86 FL (ref 80–100)
MONOCYTES # BLD AUTO: 0.36 X10*3/UL (ref 0.1–1)
MONOCYTES # BLD AUTO: 0.5 X10*3/UL (ref 0.1–1)
MONOCYTES NFR BLD AUTO: 5.9 %
MONOCYTES NFR BLD AUTO: 6.1 %
MUCOUS THREADS #/AREA URNS AUTO: ABNORMAL /LPF
NEUTROPHILS # BLD AUTO: 3.7 X10*3/UL (ref 1.2–7.7)
NEUTROPHILS # BLD AUTO: 6.52 X10*3/UL (ref 1.2–7.7)
NEUTROPHILS NFR BLD AUTO: 63.1 %
NEUTROPHILS NFR BLD AUTO: 77.3 %
NITRITE UR QL STRIP.AUTO: NEGATIVE
NON HDL CHOLESTEROL: 118 MG/DL (ref 0–149)
NRBC BLD-RTO: 0 /100 WBCS (ref 0–0)
NRBC BLD-RTO: 0 /100 WBCS (ref 0–0)
PH UR STRIP.AUTO: 5 [PH]
PHOSPHATE SERPL-MCNC: 3.8 MG/DL (ref 2.5–4.9)
PLATELET # BLD AUTO: 232 X10*3/UL (ref 150–450)
PLATELET # BLD AUTO: 233 X10*3/UL (ref 150–450)
POTASSIUM SERPL-SCNC: 3.6 MMOL/L (ref 3.5–5.3)
POTASSIUM SERPL-SCNC: 3.8 MMOL/L (ref 3.5–5.3)
PROT SERPL-MCNC: 7.4 G/DL (ref 6.4–8.2)
PROT SERPL-MCNC: 7.8 G/DL (ref 6.4–8.2)
PROT UR STRIP.AUTO-MCNC: NEGATIVE MG/DL
PROTHROMBIN TIME: 13.1 SECONDS (ref 9.8–12.8)
PROTHROMBIN TIME: 13.3 SECONDS (ref 9.8–12.8)
RBC # BLD AUTO: 4.61 X10*6/UL (ref 4–5.2)
RBC # BLD AUTO: 4.77 X10*6/UL (ref 4–5.2)
RBC # UR STRIP.AUTO: ABNORMAL /UL
RBC #/AREA URNS AUTO: ABNORMAL /HPF
RH FACTOR (ANTIGEN D): NORMAL
SODIUM SERPL-SCNC: 142 MMOL/L (ref 136–145)
SODIUM SERPL-SCNC: 144 MMOL/L (ref 136–145)
SP GR UR STRIP.AUTO: 1.01
TRIGL SERPL-MCNC: 182 MG/DL (ref 0–149)
UFH PPP CHRO-ACNC: 0.6 IU/ML
UFH PPP CHRO-ACNC: 0.8 IU/ML
UFH PPP CHRO-ACNC: 0.9 IU/ML
UROBILINOGEN UR STRIP.AUTO-MCNC: NORMAL MG/DL
VLDL: 36 MG/DL (ref 0–40)
WBC # BLD AUTO: 5.9 X10*3/UL (ref 4.4–11.3)
WBC # BLD AUTO: 8.4 X10*3/UL (ref 4.4–11.3)
WBC #/AREA URNS AUTO: ABNORMAL /HPF

## 2024-12-12 PROCEDURE — 85610 PROTHROMBIN TIME: CPT

## 2024-12-12 PROCEDURE — 80053 COMPREHEN METABOLIC PANEL: CPT

## 2024-12-12 PROCEDURE — 85520 HEPARIN ASSAY: CPT | Performed by: STUDENT IN AN ORGANIZED HEALTH CARE EDUCATION/TRAINING PROGRAM

## 2024-12-12 PROCEDURE — 81001 URINALYSIS AUTO W/SCOPE: CPT

## 2024-12-12 PROCEDURE — 2500000001 HC RX 250 WO HCPCS SELF ADMINISTERED DRUGS (ALT 637 FOR MEDICARE OP)

## 2024-12-12 PROCEDURE — 86146 BETA-2 GLYCOPROTEIN ANTIBODY: CPT

## 2024-12-12 PROCEDURE — 99223 1ST HOSP IP/OBS HIGH 75: CPT | Performed by: STUDENT IN AN ORGANIZED HEALTH CARE EDUCATION/TRAINING PROGRAM

## 2024-12-12 PROCEDURE — 84100 ASSAY OF PHOSPHORUS: CPT

## 2024-12-12 PROCEDURE — 1100000001 HC PRIVATE ROOM DAILY

## 2024-12-12 PROCEDURE — 96374 THER/PROPH/DIAG INJ IV PUSH: CPT

## 2024-12-12 PROCEDURE — 80061 LIPID PANEL: CPT

## 2024-12-12 PROCEDURE — 83690 ASSAY OF LIPASE: CPT

## 2024-12-12 PROCEDURE — 2500000004 HC RX 250 GENERAL PHARMACY W/ HCPCS (ALT 636 FOR OP/ED)

## 2024-12-12 PROCEDURE — 86901 BLOOD TYPING SEROLOGIC RH(D): CPT

## 2024-12-12 PROCEDURE — 87340 HEPATITIS B SURFACE AG IA: CPT

## 2024-12-12 PROCEDURE — P9612 CATHETERIZE FOR URINE SPEC: HCPCS

## 2024-12-12 PROCEDURE — 71275 CT ANGIOGRAPHY CHEST: CPT | Mod: FOREIGN READ | Performed by: RADIOLOGY

## 2024-12-12 PROCEDURE — 2500000005 HC RX 250 GENERAL PHARMACY W/O HCPCS

## 2024-12-12 PROCEDURE — 83735 ASSAY OF MAGNESIUM: CPT

## 2024-12-12 PROCEDURE — 85520 HEPARIN ASSAY: CPT

## 2024-12-12 PROCEDURE — 71275 CT ANGIOGRAPHY CHEST: CPT

## 2024-12-12 PROCEDURE — 73620 X-RAY EXAM OF FOOT: CPT | Mod: RT

## 2024-12-12 PROCEDURE — 73610 X-RAY EXAM OF ANKLE: CPT | Mod: RT

## 2024-12-12 PROCEDURE — 86706 HEP B SURFACE ANTIBODY: CPT

## 2024-12-12 PROCEDURE — 85025 COMPLETE CBC W/AUTO DIFF WBC: CPT

## 2024-12-12 PROCEDURE — 82248 BILIRUBIN DIRECT: CPT

## 2024-12-12 PROCEDURE — 86147 CARDIOLIPIN ANTIBODY EA IG: CPT

## 2024-12-12 PROCEDURE — 2550000001 HC RX 255 CONTRASTS: Performed by: EMERGENCY MEDICINE

## 2024-12-12 PROCEDURE — 36415 COLL VENOUS BLD VENIPUNCTURE: CPT

## 2024-12-12 PROCEDURE — 87086 URINE CULTURE/COLONY COUNT: CPT

## 2024-12-12 PROCEDURE — 84702 CHORIONIC GONADOTROPIN TEST: CPT

## 2024-12-12 PROCEDURE — 83036 HEMOGLOBIN GLYCOSYLATED A1C: CPT

## 2024-12-12 PROCEDURE — 86803 HEPATITIS C AB TEST: CPT

## 2024-12-12 RX ORDER — LIDOCAINE 560 MG/1
1 PATCH PERCUTANEOUS; TOPICAL; TRANSDERMAL DAILY
Status: DISCONTINUED | OUTPATIENT
Start: 2024-12-12 | End: 2024-12-17 | Stop reason: HOSPADM

## 2024-12-12 RX ORDER — POLYETHYLENE GLYCOL 3350 17 G/17G
17 POWDER, FOR SOLUTION ORAL DAILY PRN
Status: DISCONTINUED | OUTPATIENT
Start: 2024-12-12 | End: 2024-12-17 | Stop reason: HOSPADM

## 2024-12-12 RX ORDER — BACLOFEN 10 MG/1
10 TABLET ORAL 3 TIMES DAILY
Status: DISCONTINUED | OUTPATIENT
Start: 2024-12-12 | End: 2024-12-12

## 2024-12-12 RX ORDER — CEFTRIAXONE 1 G/50ML
1 INJECTION, SOLUTION INTRAVENOUS EVERY 24 HOURS
Status: DISCONTINUED | OUTPATIENT
Start: 2024-12-12 | End: 2024-12-12

## 2024-12-12 RX ORDER — ONDANSETRON HYDROCHLORIDE 2 MG/ML
INJECTION, SOLUTION INTRAVENOUS
Status: COMPLETED
Start: 2024-12-12 | End: 2024-12-12

## 2024-12-12 RX ORDER — HEPARIN SODIUM 10000 [USP'U]/100ML
0-4500 INJECTION, SOLUTION INTRAVENOUS CONTINUOUS
Status: DISCONTINUED | OUTPATIENT
Start: 2024-12-12 | End: 2024-12-12 | Stop reason: ENTERED-IN-ERROR

## 2024-12-12 RX ORDER — FERROUS SULFATE 325(65) MG
65 TABLET ORAL
Status: DISCONTINUED | OUTPATIENT
Start: 2024-12-12 | End: 2024-12-17 | Stop reason: HOSPADM

## 2024-12-12 RX ORDER — OXYCODONE HYDROCHLORIDE 5 MG/1
5 TABLET ORAL EVERY 6 HOURS PRN
Status: DISCONTINUED | OUTPATIENT
Start: 2024-12-12 | End: 2024-12-17 | Stop reason: HOSPADM

## 2024-12-12 RX ORDER — HEPARIN SODIUM 10000 [USP'U]/100ML
0-4500 INJECTION, SOLUTION INTRAVENOUS CONTINUOUS
Status: DISPENSED | OUTPATIENT
Start: 2024-12-12 | End: 2024-12-12

## 2024-12-12 RX ORDER — ONDANSETRON HYDROCHLORIDE 2 MG/ML
4 INJECTION, SOLUTION INTRAVENOUS ONCE
Status: COMPLETED | OUTPATIENT
Start: 2024-12-12 | End: 2024-12-12

## 2024-12-12 RX ORDER — ATORVASTATIN CALCIUM 80 MG/1
80 TABLET, FILM COATED ORAL DAILY
Status: DISCONTINUED | OUTPATIENT
Start: 2024-12-12 | End: 2024-12-17 | Stop reason: HOSPADM

## 2024-12-12 RX ORDER — BACLOFEN 10 MG/1
20 TABLET ORAL 3 TIMES DAILY
Status: DISCONTINUED | OUTPATIENT
Start: 2024-12-12 | End: 2024-12-12

## 2024-12-12 RX ORDER — ACETAMINOPHEN 325 MG/1
975 TABLET ORAL 2 TIMES DAILY PRN
Status: DISCONTINUED | OUTPATIENT
Start: 2024-12-12 | End: 2024-12-17 | Stop reason: HOSPADM

## 2024-12-12 RX ORDER — GABAPENTIN 100 MG/1
100 CAPSULE ORAL 3 TIMES DAILY
Status: DISCONTINUED | OUTPATIENT
Start: 2024-12-12 | End: 2024-12-13

## 2024-12-12 SDOH — SOCIAL STABILITY: SOCIAL INSECURITY: HAS ANYONE EVER THREATENED TO HURT YOUR FAMILY OR YOUR PETS?: UNABLE TO ASSESS

## 2024-12-12 SDOH — SOCIAL STABILITY: SOCIAL INSECURITY
WITHIN THE LAST YEAR, HAVE YOU BEEN HUMILIATED OR EMOTIONALLY ABUSED IN OTHER WAYS BY YOUR PARTNER OR EX-PARTNER?: PATIENT UNABLE TO ANSWER

## 2024-12-12 SDOH — SOCIAL STABILITY: SOCIAL INSECURITY: DO YOU FEEL ANYONE HAS EXPLOITED OR TAKEN ADVANTAGE OF YOU FINANCIALLY OR OF YOUR PERSONAL PROPERTY?: UNABLE TO ASSESS

## 2024-12-12 SDOH — ECONOMIC STABILITY: TRANSPORTATION INSECURITY
IN THE PAST 12 MONTHS, HAS LACK OF TRANSPORTATION KEPT YOU FROM MEDICAL APPOINTMENTS OR FROM GETTING MEDICATIONS?: PATIENT UNABLE TO ANSWER

## 2024-12-12 SDOH — SOCIAL STABILITY: SOCIAL INSECURITY: ARE YOU OR HAVE YOU BEEN THREATENED OR ABUSED PHYSICALLY, EMOTIONALLY, OR SEXUALLY BY ANYONE?: UNABLE TO ASSESS

## 2024-12-12 SDOH — ECONOMIC STABILITY: FOOD INSECURITY
WITHIN THE PAST 12 MONTHS, YOU WORRIED THAT YOUR FOOD WOULD RUN OUT BEFORE YOU GOT THE MONEY TO BUY MORE.: PATIENT UNABLE TO ANSWER

## 2024-12-12 SDOH — SOCIAL STABILITY: SOCIAL INSECURITY: WERE YOU ABLE TO COMPLETE ALL THE BEHAVIORAL HEALTH SCREENINGS?: NO

## 2024-12-12 SDOH — SOCIAL STABILITY: SOCIAL INSECURITY: ABUSE: ADULT

## 2024-12-12 SDOH — SOCIAL STABILITY: SOCIAL INSECURITY: DO YOU FEEL UNSAFE GOING BACK TO THE PLACE WHERE YOU ARE LIVING?: UNABLE TO ASSESS

## 2024-12-12 SDOH — ECONOMIC STABILITY: FOOD INSECURITY
WITHIN THE PAST 12 MONTHS, THE FOOD YOU BOUGHT JUST DIDN'T LAST AND YOU DIDN'T HAVE MONEY TO GET MORE.: PATIENT UNABLE TO ANSWER

## 2024-12-12 SDOH — SOCIAL STABILITY: SOCIAL INSECURITY
WITHIN THE LAST YEAR, HAVE YOU BEEN KICKED, HIT, SLAPPED, OR OTHERWISE PHYSICALLY HURT BY YOUR PARTNER OR EX-PARTNER?: PATIENT UNABLE TO ANSWER

## 2024-12-12 SDOH — ECONOMIC STABILITY: INCOME INSECURITY
IN THE PAST 12 MONTHS HAS THE ELECTRIC, GAS, OIL, OR WATER COMPANY THREATENED TO SHUT OFF SERVICES IN YOUR HOME?: PATIENT UNABLE TO ANSWER

## 2024-12-12 SDOH — SOCIAL STABILITY: SOCIAL INSECURITY: WITHIN THE LAST YEAR, HAVE YOU BEEN AFRAID OF YOUR PARTNER OR EX-PARTNER?: PATIENT UNABLE TO ANSWER

## 2024-12-12 SDOH — SOCIAL STABILITY: SOCIAL INSECURITY: ARE THERE ANY APPARENT SIGNS OF INJURIES/BEHAVIORS THAT COULD BE RELATED TO ABUSE/NEGLECT?: NO

## 2024-12-12 SDOH — ECONOMIC STABILITY: HOUSING INSECURITY: AT ANY TIME IN THE PAST 12 MONTHS, WERE YOU HOMELESS OR LIVING IN A SHELTER (INCLUDING NOW)?: PATIENT UNABLE TO ANSWER

## 2024-12-12 SDOH — SOCIAL STABILITY: SOCIAL INSECURITY
WITHIN THE LAST YEAR, HAVE YOU BEEN RAPED OR FORCED TO HAVE ANY KIND OF SEXUAL ACTIVITY BY YOUR PARTNER OR EX-PARTNER?: PATIENT UNABLE TO ANSWER

## 2024-12-12 SDOH — SOCIAL STABILITY: SOCIAL INSECURITY: DOES ANYONE TRY TO KEEP YOU FROM HAVING/CONTACTING OTHER FRIENDS OR DOING THINGS OUTSIDE YOUR HOME?: UNABLE TO ASSESS

## 2024-12-12 SDOH — SOCIAL STABILITY: SOCIAL INSECURITY: HAVE YOU HAD THOUGHTS OF HARMING ANYONE ELSE?: UNABLE TO ASSESS

## 2024-12-12 SDOH — ECONOMIC STABILITY: HOUSING INSECURITY: IN THE PAST 12 MONTHS, HOW MANY TIMES HAVE YOU MOVED WHERE YOU WERE LIVING?: 0

## 2024-12-12 SDOH — ECONOMIC STABILITY: HOUSING INSECURITY
IN THE LAST 12 MONTHS, WAS THERE A TIME WHEN YOU WERE NOT ABLE TO PAY THE MORTGAGE OR RENT ON TIME?: PATIENT UNABLE TO ANSWER

## 2024-12-12 SDOH — ECONOMIC STABILITY: FOOD INSECURITY
HOW HARD IS IT FOR YOU TO PAY FOR THE VERY BASICS LIKE FOOD, HOUSING, MEDICAL CARE, AND HEATING?: PATIENT UNABLE TO ANSWER

## 2024-12-12 ASSESSMENT — ACTIVITIES OF DAILY LIVING (ADL)
TOILETING: NEEDS ASSISTANCE
GROOMING: NEEDS ASSISTANCE
HEARING - LEFT EAR: FUNCTIONAL
ADEQUATE_TO_COMPLETE_ADL: UNABLE TO ASSESS
FEEDING YOURSELF: NEEDS ASSISTANCE
WALKS IN HOME: DEPENDENT
HEARING - RIGHT EAR: FUNCTIONAL
JUDGMENT_ADEQUATE_SAFELY_COMPLETE_DAILY_ACTIVITIES: UNABLE TO ASSESS
ASSISTIVE_DEVICE: OTHER (COMMENT)
LACK_OF_TRANSPORTATION: PATIENT UNABLE TO ANSWER
LACK_OF_TRANSPORTATION: NO
BATHING: NEEDS ASSISTANCE
DRESSING YOURSELF: NEEDS ASSISTANCE
PATIENT'S MEMORY ADEQUATE TO SAFELY COMPLETE DAILY ACTIVITIES?: UNABLE TO ASSESS

## 2024-12-12 ASSESSMENT — COGNITIVE AND FUNCTIONAL STATUS - GENERAL
CLIMB 3 TO 5 STEPS WITH RAILING: TOTAL
WALKING IN HOSPITAL ROOM: TOTAL
MOVING FROM LYING ON BACK TO SITTING ON SIDE OF FLAT BED WITH BEDRAILS: A LITTLE
MOBILITY SCORE: 12
TOILETING: A LOT
DRESSING REGULAR LOWER BODY CLOTHING: A LOT
CLIMB 3 TO 5 STEPS WITH RAILING: TOTAL
STANDING UP FROM CHAIR USING ARMS: A LOT
EATING MEALS: A LITTLE
PERSONAL GROOMING: A LITTLE
PATIENT BASELINE BEDBOUND: NO
DRESSING REGULAR UPPER BODY CLOTHING: A LITTLE
TURNING FROM BACK TO SIDE WHILE IN FLAT BAD: A LITTLE
MOVING TO AND FROM BED TO CHAIR: A LOT
EATING MEALS: A LITTLE
TURNING FROM BACK TO SIDE WHILE IN FLAT BAD: A LITTLE
WALKING IN HOSPITAL ROOM: TOTAL
HELP NEEDED FOR BATHING: A LITTLE
MOVING TO AND FROM BED TO CHAIR: A LOT
STANDING UP FROM CHAIR USING ARMS: A LOT
DAILY ACTIVITIY SCORE: 16
PERSONAL GROOMING: A LOT
HELP NEEDED FOR BATHING: A LOT
TOILETING: A LOT
MOVING FROM LYING ON BACK TO SITTING ON SIDE OF FLAT BED WITH BEDRAILS: A LITTLE
DAILY ACTIVITIY SCORE: 14
MOBILITY SCORE: 12
DRESSING REGULAR LOWER BODY CLOTHING: A LOT
DRESSING REGULAR UPPER BODY CLOTHING: A LITTLE

## 2024-12-12 ASSESSMENT — LIFESTYLE VARIABLES
SKIP TO QUESTIONS 9-10: 0
AUDIT-C TOTAL SCORE: -1
AUDIT-C TOTAL SCORE: -1
HOW OFTEN DO YOU HAVE 6 OR MORE DRINKS ON ONE OCCASION: PATIENT UNABLE TO ANSWER
HOW OFTEN DO YOU HAVE A DRINK CONTAINING ALCOHOL: PATIENT UNABLE TO ANSWER
HOW MANY STANDARD DRINKS CONTAINING ALCOHOL DO YOU HAVE ON A TYPICAL DAY: PATIENT UNABLE TO ANSWER

## 2024-12-12 ASSESSMENT — PAIN SCALES - GENERAL
PAINLEVEL_OUTOF10: 0 - NO PAIN
PAINLEVEL_OUTOF10: 5 - MODERATE PAIN

## 2024-12-12 ASSESSMENT — PATIENT HEALTH QUESTIONNAIRE - PHQ9
1. LITTLE INTEREST OR PLEASURE IN DOING THINGS: NOT AT ALL
SUM OF ALL RESPONSES TO PHQ9 QUESTIONS 1 & 2: 0
2. FEELING DOWN, DEPRESSED OR HOPELESS: NOT AT ALL

## 2024-12-12 ASSESSMENT — PAIN - FUNCTIONAL ASSESSMENT: PAIN_FUNCTIONAL_ASSESSMENT: 0-10

## 2024-12-12 NOTE — PROGRESS NOTES
Loan Osborne is a 54 y.o. female on day 0 of admission presenting with Acute deep vein thrombosis (DVT) of femoral vein of both lower extremities (Multi).      Subjective   Ms Osborne is still complaining of bilateral leg pain, but more pronounced on the right side particularly in the foot and ankle and around where the brace was.        Objective     Last Recorded Vitals  /88   Pulse 84   Temp 36.6 °C (97.9 °F) (Temporal)   Resp 19   Wt 69.9 kg (154 lb)   SpO2 97%   Intake/Output last 3 Shifts:  No intake or output data in the 24 hours ending 12/12/24 1232    Admission Weight  Weight: 69.9 kg (154 lb) (12/11/24 2007)    Daily Weight  12/11/24 : 69.9 kg (154 lb)    Image Results  XR ankle right 3+ views, XR foot right 1-2 views  Narrative: Interpreted By:  Maxwell Peres,   STUDY:  Right ankle and foot dated 12/12/2024.      INDICATION:  Signs/Symptoms:Ankle pain; Signs/Symptoms:Foot pain      COMPARISON:  Radiographs dated 12/11/2024.      ACCESSION NUMBER(S):  [id number]; [id number]      ORDERING CLINICIAN:  BERNARDO NAPIER      TECHNIQUE:  Three views radiographs of the right ankle and foot.      FINDINGS:  Foot radiographs are limited due to positioning. Bones are  demineralized. No fracture or dislocation is evident. The ankle  mortise is congruent and the tibial plafond and talar dome are  intact.    No ankle joint effusion is evident. No soft tissue gas or  radiopaque foreign body is evident.      Impression: No osseous injury is evident. If there remains concern for osseous  injury, CT is recommended.      MACRO:  None      Signed by: Maxwell Peres 12/12/2024 11:46 AM  Dictation workstation:   [id number]  CT angio chest for pulmonary embolism  Narrative: STUDY:  CT Angiogram of the Chest; 12/12/2024 at 2:34 AM.  INDICATION:  Bilateral lower extremity DVT, shortness of breath.  COMPARISON:  None available.  ACCESSION NUMBER(S):  [id number]  ORDERING CLINICIAN:  MARGARITA MCPHERSON  TECHNIQUE:   CTA of the chest was performed with intravenous contrast.   Images are reviewed and processed at a workstation according to the CT  angiogram protocol with 3-D and/or MIP post processing imaging  generated.  Omnipaque 350 90 mL was administered intravenously.  Automated mA/kV exposure control was utilized and patient examination  was performed in strict accordance with principles of ALARA.  FINDINGS:  Pulmonary arteries are adequately opacified without acute or chronic  filling defects.  The thoracic aorta is normal in course and caliber  without dissection or aneurysm.  The heart is normal in size without pericardial effusion.  Thoracic  lymph nodes are not enlarged.  There is no pleural effusion, pleural thickening, or pneumothorax.   The airways are patent.  Lungs are clear without consolidation, interstitial disease, or  suspicious nodules.  Upper abdomen demonstrates no acute pathology.  There are no acute fractures.  No suspicious bony lesions.  Impression: No evidence of pulmonary embolism.  Signed by Quinten Santana MD  Lower extremity venous duplex right  Narrative: Interpreted By:  Bruno Lr and Benza Andrew   STUDY:  [facility] US LOWER EXTREMITY VENOUS DUPLEX RIGHT;  12/11/2024 10:58 pm      INDICATION:  Signs/Symptoms:Immobilization from stroke, swelling.          COMPARISON:  Duplex lower extremity ultrasound dated 08/11/2020      ACCESSION NUMBER(S):  [id number]      ORDERING CLINICIAN:  AGUSTIN VARGAS      TECHNIQUE:  Vascular ultrasound of the bilateral lower extremities was performed.  Real-time compression views as well as Gray scale, color Doppler and  spectral Doppler waveform analysis was performed.      FINDINGS:  Evaluation of the visualized portions of the bilateral common femoral  vein, proximal, mid, and distal femoral vein, and popliteal vein were  performed/attempted.      Limitations:  Examination is limited by lack of patient mobility and  small vessels.      The right proximal  femoral vein and the left common femoral vein  demonstrate partial compressibility with echogenic material seen  within the lumen. The right common femoral vein and right posterior  tibial vein are visualized and are compressible. The remainder of the  lower extremity veins were not visualized due to the limitations of  the study.          Impression: Examination is limited by lack of patient mobility and small vessels.  Within these limitations:      Partial compressibility of the right proximal femoral vein and left  common femoral vein, most compatible with thrombus.      I personally reviewed the images/study and I agree with the findings  as stated above by resident physician, Jose J Ferguson MD. This study  was interpreted at [place], [place].      MACRO:  Critical Finding:  Thrombosis. Notification was initiated on  12/11/2024 at 11:37 pm by  Jose J Ferguson.  (**-OCF-**) Instructions:      Signed by: Bruno Lr 12/12/2024 12:01 AM  Dictation workstation:   [id number]      Physical Exam    Gen: well appearing, A+Ox3, in no acute distress  HEENT: sclera anicteric, no conjunctival injection, MMM  Resp: CTAB, normal breath sounds, no wheezing/crackles  CV: RRR, normal S1/S2  GI: Suprapubic tenderness/ Fullness, non-distended, no rebound or guarding  Extremities/MSK: warm and well perfused, no edema bilateral, Right thigh pain  Spastic, contracted RUE  Neuro: A+Ox3, no focal deficits, no asterixis  Skin: warm and dry, no lesions, no rashes        Scheduled medications  apixaban, 10 mg, oral, BID   Followed by  [START ON 12/19/2024] apixaban, 5 mg, oral, BID  atorvastatin, 80 mg, oral, Daily  ferrous sulfate (325 mg ferrous sulfate), 65 mg of iron, oral, Daily with breakfast  gabapentin, 100 mg, oral, TID  lidocaine, 1 patch, transdermal, Daily      Continuous medications  heparin, 0-4,500 Units/hr, Last Rate: 1,200 Units/hr (12/12/24 0909)      PRN medications  PRN  medications: acetaminophen, heparin, oxyCODONE, polyethylene glycol     XR ankle right 3+ views    Result Date: 12/12/2024  Interpreted By:  Maxwell Peres, STUDY: Right ankle and foot dated 12/12/2024.   INDICATION: Signs/Symptoms:Ankle pain; Signs/Symptoms:Foot pain   COMPARISON: Radiographs dated 12/11/2024.   ACCESSION NUMBER(S): [id number]; [id number]   ORDERING CLINICIAN: BERNARDO NAPIER   TECHNIQUE: Three views radiographs of the right ankle and foot.   FINDINGS: Foot radiographs are limited due to positioning. Bones are demineralized. No fracture or dislocation is evident. The ankle mortise is congruent and the tibial plafond and talar dome are intact.    No ankle joint effusion is evident. No soft tissue gas or radiopaque foreign body is evident.       No osseous injury is evident. If there remains concern for osseous injury, CT is recommended.   MACRO: None   Signed by: Maxwell Peres 12/12/2024 11:46 AM Dictation workstation:   [id number]    XR foot right 1-2 views    Result Date: 12/12/2024  Interpreted By:  Maxwell Peres, STUDY: Right ankle and foot dated 12/12/2024.   INDICATION: Signs/Symptoms:Ankle pain; Signs/Symptoms:Foot pain   COMPARISON: Radiographs dated 12/11/2024.   ACCESSION NUMBER(S): [id number]; [id number]   ORDERING CLINICIAN: BERNARDO NAPIER   TECHNIQUE: Three views radiographs of the right ankle and foot.   FINDINGS: Foot radiographs are limited due to positioning. Bones are demineralized. No fracture or dislocation is evident. The ankle mortise is congruent and the tibial plafond and talar dome are intact.    No ankle joint effusion is evident. No soft tissue gas or radiopaque foreign body is evident.       No osseous injury is evident. If there remains concern for osseous injury, CT is recommended.   MACRO: None   Signed by: Maxwell Peres 12/12/2024 11:46 AM Dictation workstation:   [id number]    CT angio chest for pulmonary embolism    Result Date: 12/12/2024  STUDY: CT  Angiogram of the Chest; 12/12/2024 at 2:34 AM. INDICATION: Bilateral lower extremity DVT, shortness of breath. COMPARISON: None available. ACCESSION NUMBER(S): [id number] ORDERING CLINICIAN: MARGARITA MCPHERSON TECHNIQUE:  CTA of the chest was performed with intravenous contrast. Images are reviewed and processed at a workstation according to the CT angiogram protocol with 3-D and/or MIP post processing imaging generated.  Omnipaque 350 90 mL was administered intravenously. Automated mA/kV exposure control was utilized and patient examination was performed in strict accordance with principles of ALARA. FINDINGS: Pulmonary arteries are adequately opacified without acute or chronic filling defects.  The thoracic aorta is normal in course and caliber without dissection or aneurysm. The heart is normal in size without pericardial effusion.  Thoracic lymph nodes are not enlarged. There is no pleural effusion, pleural thickening, or pneumothorax. The airways are patent. Lungs are clear without consolidation, interstitial disease, or suspicious nodules. Upper abdomen demonstrates no acute pathology. There are no acute fractures.  No suspicious bony lesions.    No evidence of pulmonary embolism. Signed by Quinten Santana MD    Lower extremity venous duplex right    Result Date: 12/12/2024  Interpreted By:  Bruno Lr and Benza Andrew STUDY: [facility] US LOWER EXTREMITY VENOUS DUPLEX RIGHT;  12/11/2024 10:58 pm   INDICATION: Signs/Symptoms:Immobilization from stroke, swelling.     COMPARISON: Duplex lower extremity ultrasound dated 08/11/2020   ACCESSION NUMBER(S): [id number]   ORDERING CLINICIAN: AGUSTIN VARGAS   TECHNIQUE: Vascular ultrasound of the bilateral lower extremities was performed. Real-time compression views as well as Gray scale, color Doppler and spectral Doppler waveform analysis was performed.   FINDINGS: Evaluation of the visualized portions of the bilateral common femoral vein, proximal, mid, and distal  femoral vein, and popliteal vein were performed/attempted.   Limitations:  Examination is limited by lack of patient mobility and small vessels.   The right proximal femoral vein and the left common femoral vein demonstrate partial compressibility with echogenic material seen within the lumen. The right common femoral vein and right posterior tibial vein are visualized and are compressible. The remainder of the lower extremity veins were not visualized due to the limitations of the study.         Examination is limited by lack of patient mobility and small vessels. Within these limitations:   Partial compressibility of the right proximal femoral vein and left common femoral vein, most compatible with thrombus.   I personally reviewed the images/study and I agree with the findings as stated above by resident physician, Jose J Ferguson MD. This study was interpreted at [facility], [place], [place].   MACRO: Critical Finding:  Thrombosis. Notification was initiated on 12/11/2024 at 11:37 pm by  Jose J Ferguson.  (**-OCF-**) Instructions:   Signed by: Bruno Lr 12/12/2024 12:01 AM Dictation workstation:   [id number]    XR knee right 1-2 views    Result Date: 12/11/2024  STUDY: Knee Radiographs INDICATION: Pain in right lower extremity in several areas. Aphasic, so history is extremely difficult COMPARISON: None Available. ACCESSION NUMBER(S): [id number] ORDERING CLINICIAN: AGUSTIN VARGAS TECHNIQUE:  Two view(s) of the right knee. FINDINGS:  There is no displaced fracture.  The alignment is anatomic.  No soft tissue abnormality is seen.  There is no joint effusion.    No acute osseous abnormality. Signed by Kwasi Morelos MD    XR tibia fibula right 2 views    Result Date: 12/11/2024  STUDY: Right foot, ankle, and tibia and fibula radiographs; 12/11/2024 at 9:42 PM. INDICATION: Pain in right lower extremity in several areas.  Aphasic so history is difficult. COMPARISON: XR right  ankle 8/11/2020. ACCESSION NUMBER(S): [id number], [id number], [id number] ORDERING CLINICIAN: AGUSTIN VARGAS TECHNIQUE:  Two views of the right foot (three images), two views of the right ankle (three images), and two views of the right tibia and fibula (five images). FINDINGS:  Right Foot:  There is no displaced fracture.  The alignment is anatomic.  No soft tissue abnormality is seen. Right Ankle:  There is no displaced fracture.  The alignment is anatomic.  No soft tissue abnormality is seen. Right Tibia and Fibula:  There is no displaced fracture.  The alignment is anatomic.  No soft tissue abnormality is seen.    Right Foot:  _No acute displaced fracture or dislocation__. Right Ankle:  No acute displaced fracture or dislocation. Right Tibia and Fibula:  No acute displaced fracture or dislocation. Signed by Kwasi Morelos MD    XR foot right 1-2 views    Result Date: 12/11/2024  STUDY: Right foot, ankle, and tibia and fibula radiographs; 12/11/2024 at 9:42 PM. INDICATION: Pain in right lower extremity in several areas.  Aphasic so history is difficult. COMPARISON: XR right ankle 8/11/2020. ACCESSION NUMBER(S): [id number], [id number], [id number] ORDERING CLINICIAN: AGUSTIN VARGAS TECHNIQUE:  Two views of the right foot (three images), two views of the right ankle (three images), and two views of the right tibia and fibula (five images). FINDINGS:  Right Foot:  There is no displaced fracture.  The alignment is anatomic.  No soft tissue abnormality is seen. Right Ankle:  There is no displaced fracture.  The alignment is anatomic.  No soft tissue abnormality is seen. Right Tibia and Fibula:  There is no displaced fracture.  The alignment is anatomic.  No soft tissue abnormality is seen.    Right Foot:  _No acute displaced fracture or dislocation__. Right Ankle:  No acute displaced fracture or dislocation. Right Tibia and Fibula:  No acute displaced fracture or dislocation. Signed by Kwasi Morelos  MD    XR ankle right 2 views    Result Date: 12/11/2024  STUDY: Right foot, ankle, and tibia and fibula radiographs; 12/11/2024 at 9:42 PM. INDICATION: Pain in right lower extremity in several areas.  Aphasic so history is difficult. COMPARISON: XR right ankle 8/11/2020. ACCESSION NUMBER(S): [id number], [id number], [id number] ORDERING CLINICIAN: AGUSTIN VARGAS TECHNIQUE:  Two views of the right foot (three images), two views of the right ankle (three images), and two views of the right tibia and fibula (five images). FINDINGS:  Right Foot:  There is no displaced fracture.  The alignment is anatomic.  No soft tissue abnormality is seen. Right Ankle:  There is no displaced fracture.  The alignment is anatomic.  No soft tissue abnormality is seen. Right Tibia and Fibula:  There is no displaced fracture.  The alignment is anatomic.  No soft tissue abnormality is seen.    Right Foot:  _No acute displaced fracture or dislocation__. Right Ankle:  No acute displaced fracture or dislocation. Right Tibia and Fibula:  No acute displaced fracture or dislocation. Signed by Kwasi Morelos MD    XR femur right 2+ views    Result Date: 12/11/2024  STUDY: Femur Radiographs; 12/11/2024 9:42 PM INDICATION: Right lower extremity pain.  Additional History:  Aphasic, difficulty with history. COMPARISON: None. ACCESSION NUMBER(S): [id number] ORDERING CLINICIAN: AGUSTIN VARGAS TECHNIQUE:  Five view(s) of the right femur. FINDINGS:  There is no displaced fracture.  The alignment is anatomic.  No soft tissue abnormality is seen.    No acute osseous abnormality. Signed by Ricki Regalado MD    XR hip right with pelvis when performed 2 or 3 views    Result Date: 12/11/2024  STUDY: Pelvis and Right Hip Radiographs; 12/11/2024 at 9:43 PM INDICATION: Pain in right lower extremity in several areas.  Patient is aphasic so history is extremely difficult. COMPARISON: None available. ACCESSION NUMBER(S): [id number] ORDERING CLINICIAN:  AGUSTIN VARGAS TECHNIQUE:  AP view of the pelvis and two view(s) of the right hip. FINDINGS:  PELVIS: The pelvic ring is intact.  There is no acute fracture. RIGHT HIP: There is no displaced fracture.  The alignment is anatomic.  No soft tissue abnormality is seen.    No acute osseous abnormality. Signed by Ricki Regalado MD        Assessment & Plan  Acute deep vein thrombosis (DVT) of femoral vein of both lower extremities (Multi)    Loan Osborne is a 54 y.o. 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 Presenting to The ED with the concern of acute right lower extremity pain. Found to have an orthotic brace broken off and poking into her leg which was initially thought to be the source of her pain. DVT study revealed bilateral DVTs - R proximal femoral and L common femoral thrombus, a CT PE is neg. The patient is admitted to Medical team for failure of Eliquis therapy, started on Heparin drip.      # Acute Bilateral DVT while on Apixaban  # Failure of Apixaban Therapy  # DVT/PE (2017, 2019)   :: LLE DVT in 2019 was attributed to Eliquis, charted as non compliance? Unsure if the patient remained on AC after the first event or it was discontinued , CT venogram during that admission  with evidence of chronic appearance left common iliac and external iliac thrombus   :: June 2017 she had a deep vein thrombosis and pulmonary embolus. She was placed on Xarelto at that time; a prior note states it was stopped in September 2017 after a CT scan.   :: US Doppler Temo LE: Examination is limited by lack of patient mobility and small vessels.  Within these limitations: Partial compressibility of the right proximal femoral vein and left  common femoral vein, most compatible with thrombu.  :: CTPE neg     - Considering CT venogram for further clarification of limited US doppler findings  - C/W heparin gtt  - XR negative for  "fractures of R foot  - Calling family for more collateral on eliquis     #\"Cryptogenic\" Stroke 9/2016  :: L MCA, L M1 occlusion, residual severe aphasia and spastic Right hemiparesis.   :: Anticardiolipin antibody, antinuclear antibody, beta-2 glycoprotein DRVVT - all normal.   Workup was not revealing for etiology of her stroke     - Continue Baclofen, Atorvastatin  - Continue Home PT  - as AFO is broken, plan to order a new one before discharge     # Acute urine retention, Dysuria  # Hx of fibroids with previous episodes of menorrhagia   No surgical intervention done for fibroids- per chart review the patient was not agreeable to it.   Likely iso of acute pain and opioids, UA to rule out infection. Known fibroid can be contributing,     - follow up bladder scan   - UA pending  - Consider a US abdomen/ Gynecological US for evaluation     #High blood pressure reading  :: Not known to have hypertension  :: SBP in the 150s , iso of pain and discomfort.      - CTM, consider starting an antihypertensive medication of BP continues to be high during the admission      # Elevated Liver enzymes  :: ALT 55, AST 47 not previously noted to be elevated. last checked 3/2024  ::  ( noted to be elevated within the same range since 2021)  :: , TG 73, Chol 230 3/2024, HbA1c 5.1  :: no abdominal pain, No vomiting, some nausea associated with IV morphine     - Consider liver US if increase in AM  - hepatitis serology negative   - repeat LDL, HbA1c      # Depression   sertraline (Zoloft) 100 mg        N: regular diet  A: PIV  O2: RA  DVT ppx: Heparin gtt  GI ppx: Not indicated  Code Status: Full code  Contact:  MARGRET VÁZQUEZ (Daughter)  [phone] (Mobile)               Sampson Green MD      "

## 2024-12-12 NOTE — H&P
Not finalized until signed by the attending     [facility]  December 12, 2024   Patient: Loan Osborne    Medical Record: [id number]    SUBJECTIVE     Loan Osborne is a 54 y.o. 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 Presenting to The ED with the concern of acute right lower extremity pain. Found to have an orthotic brace broken off and poking into her leg which was initially thought to be the source of her pain. DVT study revealed bilateral DVTs - R proximal femoral and L common femoral thrombus, a CT PE is neg. The patient is admitted to Medical team for failure of Eliquis therapy, started on Heparin drip.     At the time of interview the patient was unaccompanied by her family, and unable to be reached over the phone.   The patient is pleasant looks comfortable laying in bed, she is able to answer yes and no questions and gestures and demonstrated good understanding of the questions.   She had an acute Rt LE pain that improved on removing the broken brace piece, but did not completely go away, denied falls. And denies having any pain on the left LE.   She had no fever, chest pain, or shortness of breath.  reports compliance on all her medication, including Eliquis and her Heparin assay was 0.6  has suprapubic pain and reports dysuria. She is not known to have HTN and is not on medication for Blood pressure.  has constipation and last BM was 2 days ago.    12 point ROS otherwise negative, unless indicated above.     Previous relevant admissions:  :: LLE DVT in 2019 was attributed to Eliquis, charted as non compliance? Unsure if the patient remained on AC after the first event or it was discontinued , CT venogram during that admission  with evidence of chronic appearance left common iliac and external iliac thrombus   :: June 2017 she had a deep vein thrombosis and pulmonary  embolus. She was placed on Xarelto at that time; a prior note states it was stopped in September 2017 after a CT scan.     In the ED:  Vital signs: afebrile, on RA sat > 97%, BP 140s/ 90s, HR 80s  Labs: unremarakble  Imaging: as stated above  EKG: normal Sinus rythem  ED Course:  S/p 1 mg Dilaudid IV, Toradol 15 mg IV, Zofran 4 mg IV x2, started on heparin gtt.     Past Medical History:    Past Medical History:   Diagnosis Date    Benign neoplasm of connective and other soft tissue, unspecified 06/22/2021    Fibroids    Pain in unspecified foot 11/18/2022    Pain of foot, unspecified laterality    Personal history of other medical treatment 06/07/2017    History of screening mammography    Unspecified sequelae of cerebral infarction 08/12/2022    Chronic ischemic left MCA stroke       Home Medications  (Not in a hospital admission)      Scheduled Medications:  PRN Medications:    atorvastatin, 80 mg, oral, Daily  baclofen, 20 mg, oral, TID  ferrous sulfate (325 mg ferrous sulfate), 65 mg of iron, oral, Daily with breakfast  heparin, 80 Units/kg, intravenous, Once     PRN medications: heparin, polyethylene glycol       Surgical History:    Past Surgical History:   Procedure Laterality Date    LYMPHADENECTOMY  08/13/2014    Lymphadenectomy    MR HEAD ANGIO WO IV CONTRAST  9/16/2016    MR HEAD ANGIO WO IV CONTRAST 9/16/2016 [facility]    MR NECK ANGIO W IV CONTRAST  9/16/2016    MR NECK ANGIO W IV CONTRAST 9/16/2016 [facility] CLINICAL [facility]GAC[facility]    TUBAL LIGATION  08/13/2014    Tubal Ligation       Allergies:  No Known Allergies    Social History  Living Situation: Lives with  and children   Alcohol: denies  Tobacco: denies  Illicit Drugs: denies    Family History:    Family History   Problem Relation Name Age of Onset    Other ((CVA)) Mother      Hypertension Mother      Kidney cancer Father      Other ((CVA)) Other GRANDFATHER        OBJECTIVE     Vitals: I/O:   Vitals:    12/12/24 0136   BP: 144/90   Pulse:  "86   Resp: 18   Temp:    SpO2: 96%        24hr Min/Max:  Temp  Min: 36.6 °C (97.9 °F)  Max: 36.6 °C (97.9 °F)  Pulse  Min: 73  Max: 98  BP  Min: 129/86  Max: 176/92  Resp  Min: 18  Max: 25  SpO2  Min: 96 %  Max: 100 % No intake or output data in the 24 hours ending 12/12/24 0320     Net IO Since Admission: No IO data has been entered for this period [12/12/24 0320]      Physical Exam  Gen: well appearing, A+Ox3, in no acute distress  HEENT: sclera anicteric, no conjunctival injection, MMM  Resp: CTAB, normal breath sounds, no wheezing/crackles  CV: RRR, normal S1/S2  GI: Suprapubic tenderness/ Fullness, non-distended, no rebound or guarding  Extremities/MSK: warm and well perfused, no edema bilateral, Right thigh pain  Spastic, contracted RUE  Neuro: A+Ox3, no focal deficits, no asterixis  Skin: warm and dry, no lesions, no rashes     LABS:  CBC RFP   Lab Results   Component Value Date    WBC 5.9 12/12/2024    HGB 14.9 12/12/2024    HCT 40.7 12/12/2024    MCV 85 12/12/2024     12/12/2024    NEUTROABS 3.70 12/12/2024    Lab Results   Component Value Date     12/12/2024    K 3.6 12/12/2024     12/12/2024    CO2 17 (L) 12/12/2024    BUN 7 12/12/2024    CREATININE 0.48 (L) 12/12/2024    CREATININE 0.60 03/14/2024     Lab Results   Component Value Date    MG 1.93 09/28/2019    PHOS 3.8 09/28/2019    CALCIUM 9.4 12/12/2024         Hepatic Function ABG/VBG   Lab Results   Component Value Date    ALT 55 (H) 12/12/2024    AST 47 (H) 12/12/2024    ALKPHOS 134 (H) 12/12/2024     Lab Results   Component Value Date    BILIDIR 0.1 11/19/2019      Lab Results   Component Value Date    PROTIME 13.3 (H) 12/12/2024    APTT 29 12/12/2024    INR 1.2 (H) 12/12/2024    No results found for: \"NONUHFIRE\", \"LACTATE\"         Imaging:   CTPE  FINDINGS:  Pulmonary arteries are adequately opacified without acute or chronic  filling defects.  The thoracic aorta is normal in course and caliber  without dissection or " aneurysm.  The heart is normal in size without pericardial effusion.  Thoracic  lymph nodes are not enlarged.  There is no pleural effusion, pleural thickening, or pneumothorax.   The airways are patent.  Lungs are clear without consolidation, interstitial disease, or  suspicious nodules.  Upper abdomen demonstrates no acute pathology.  There are no acute fractures.  No suspicious bony lesions.  IMPRESSION:  No evidence of pulmonary embolism.    Ultrasound of the bilateral lower extremities   FINDINGS:  Evaluation of the visualized portions of the bilateral common femoral  vein, proximal, mid, and distal femoral vein, and popliteal vein were  performed/attempted.      Limitations:  Examination is limited by lack of patient mobility and  small vessels.      The right proximal femoral vein and the left common femoral vein  demonstrate partial compressibility with echogenic material seen  within the lumen. The right common femoral vein and right posterior  tibial vein are visualized and are compressible. The remainder of the  lower extremity veins were not visualized due to the limitations of  the study.          IMPRESSION:  Examination is limited by lack of patient mobility and small vessels.  Within these limitations:      Partial compressibility of the right proximal femoral vein and left  common femoral vein, most compatible with thrombus.        ASSESSMENT / PLAN     Loan Osborne is a 54 y.o. 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 Presenting to The ED with the concern of acute right lower extremity pain. Found to have an orthotic brace broken off and poking into her leg which was initially thought to be the source of her pain. DVT study revealed bilateral DVTs - R proximal femoral and L common femoral thrombus, a CT PE is neg. The patient is admitted to Medical team for failure of Eliquis therapy, started on  "Heparin drip.     # Acute Bilateral DVT while on Apixaban  # Failure of Apixaban Therapy  # DVT/PE (2017, 2019)   :: LLE DVT in 2019 was attributed to Eliquis, charted as non compliance? Unsure if the patient remained on AC after the first event or it was discontinued , CT venogram during that admission  with evidence of chronic appearance left common iliac and external iliac thrombus   :: June 2017 she had a deep vein thrombosis and pulmonary embolus. She was placed on Xarelto at that time; a prior note states it was stopped in September 2017 after a CT scan.     :: US Doppler Temo LE: Examination is limited by lack of patient mobility and small vessels.  Within these limitations: Partial compressibility of the right proximal femoral vein and left  common femoral vein, most compatible with thrombu.  :: CTPE neg    - Consider CT venogram for further clarification of limited US doppler findings  - Vascular medicine consult in the am (no previously noted consult in the chart)  - C/W heparin gtt  - Anticardiolipin antibody, antinuclear antibody, beta-2 glycoprotein DRVVT - all normal in 2016 as part o [facility] workup for her stroke.     #\"Cryptogenic\" Stroke 9/2016  :: L MCA, L M1 occlusion, residual severe aphasia and spastic Right hemiparesis.   :: Anticardiolipin antibody, antinuclear antibody, beta-2 glycoprotein DRVVT - all normal.   Workup was not revealing for etiology of her stroke    - Continue Baclofen, Atorvastatin  - Continue Home PT  - as AFO is broken, plan to order a new one before discharge    # Acute urine retention, Dysuria  # Hx of fibroids with previous episodes of menorrhagia   No surgical intervention done for fibroids- per chart review the patient was not agreeable to it.   Likely iso of acute pain and opioids, UA to rule out infection. Known fibroid can be contributing,    - bladder scan showed 800 ml, straight cath : 1100 ml   - follow up bladder scan in 6 hours  - UA  - Consider a US abdomen/ " Gynecological US for evaluation    #High blood pressure reading  :: Not known to have hypertension  :: SBP in the 150s , iso of pain and discomfort.     - CTM, consider starting an antihypertensive medication of BP continues to be high during the admission     # Elevated Liver enzymes  :: ALT 55, AST 47 not previously noted to be elevated. last checked 3/2024  ::  ( noted to be elevated within the same range since 2021)  :: , TG 73, Chol 230 3/2024, HbA1c 5.1  :: no abdominal pain, No vomiting, some nausea associated with IV morphine    - Consider liver US  - hepatitis serology ordered  - repeat LDL, HbA1c     # Depression   sertraline (Zoloft) 100 mg       Antibiotics:: -  Dispo: -     N: regular diet  A: PIV  O2: RA  DVT ppx: Heparin gtt  GI ppx: Not indicated  Code Status: Full code  Contact:  MARGRET VÁZQUEZ (Daughter)  [phone] (Mobile)       Harriett Aceves, PGY2 Internal Medicine

## 2024-12-12 NOTE — PROGRESS NOTES
Loan Osborne is a 54 y.o. female on day 0 of admission presenting with Acute deep vein thrombosis (DVT) of femoral vein of both lower extremities (Multi).       12/12/24 1241   Discharge Planning   Living Arrangements Spouse/significant other;Children   Support Systems Spouse/significant other;Children;Family members;Parent   Assistance Needed In home Health Care and Aide Service (spouse does not remember name) patients mothers is her in home aide with current home care company   Type of Residence Private residence   Number of Stairs to Enter Residence 0   Number of Stairs Within Residence 0   Do you have animals or pets at home? No   Who is requesting discharge planning? Provider   Home or Post Acute Services In home services   Type of Home Care Services Home health aide;Home OT;Home PT   Expected Discharge Disposition Home   Does the patient need discharge transport arranged? Yes   RoundTrip coordination needed? Yes   Has discharge transport been arranged? No   Financial Resource Strain   How hard is it for you to pay for the very basics like food, housing, medical care, and heating? Not very   Housing Stability   In the last 12 months, was there a time when you were not able to pay the mortgage or rent on time? N   At any time in the past 12 months, were you homeless or living in a shelter (including now)? N   Transportation Needs   In the past 12 months, has lack of transportation kept you from medical appointments or from getting medications? no   In the past 12 months, has lack of transportation kept you from meetings, work, or from getting things needed for daily living? No   Patient Choice   Provider Choice list and CMS website (https://medicare.gov/care-compare#search) for post-acute Quality and Resource Measure Data were provided and reviewed with: Family   Patient / Family choosing to utilize agency / facility established prior to hospitalization Yes   Stroke Family Assessment   Stroke Family Assessment  Needed No   Intensity of Service   Intensity of Service 0-30 min       SW met with patient and spouse at bedside. Patient verbally unable to engage in assessment. Spouse informed he is leaving for work at 2p if anything is need to contact daughter Hugo [phone]. Patient lives in single family home with spouse and 2 daughters 15 and 20 yo. Spouse does not know PCP stated patients mother knows this info. Mother is also one of her home health aides. Spouse unable to recall name of home health agency. Several recent falls in home due to limited leg mobility. Pending RNF.

## 2024-12-12 NOTE — PROGRESS NOTES
Emergency Department Transition of Care Note       Signout   I received Loan Osborne in signout from Dr. Taylor.  Please see the ED Provider Note for all HPI, PE and MDM up to the time of signout at 2300.  This is in addition to the primary record.    In brief Loan Osborne is an 54 y.o. female presenting for acute right sided leg pain which onset over the past few days. Patient has a history of CVA with residual right sided deficits/weakness, aphasic at baseline but answers yes/no questions. She was found to have a portion of her orthotic brace broken off and poking into her leg which was initially thought to be the source of her pain.     At the time of signout we were awaiting:  DVT Lower Extremity Study   Repair of existing brace   Final Disposition     ED Course & Medical Decision Making   Medical Decision Making:  Under my care, patient evaluated bedside and noted to have an episode of vomiting.  She is given Zofran IM.  As patient had not yet received any laboratory studies, did order basic labs given the vomiting.  Patient reported improvement in her symptoms after receiving Zofran.    Patient's lower extremity DVT Doppler study reveals a right proximal femoral thrombus as well as a left common femoral thrombus. Patient's father is at bedside reports the patient has been on Eliquis for previous blood clots.  Currently taking daily.  He manages her medications at home.  He reports that patient has also been having some shortness of breath over the past week or so.    On chart review, patient has not had any laboratory studies done in the recent future, last CBC seen in 2021.  Unclear if patient is seeing a provider outside institution not on [facility].  However, in the setting of this, will wait to initiate heparin drip in the setting of failed outpatient anticoagulation until we have basic laboratory studies resulted to ensure there is no significant thrombocytopenia etc. which may contraindicate further  anticoagulation.    Laboratory studies reviewed which reveals no significant electrolyte abnormalities on CMP, no anemia or leukocytosis on CBC.  INR 1.2 with PT of 13.3.  Lipase negative.  Beta quant negative.  CT PE, resulted following patient admission, revealed no evidence of acute pulmonary embolism.    I did reach out to the admissions coordinators, who accepted the patient for admission prior to PE study being completed.  Plan for initiation on high intensity heparin infusion protocol following initial heparin assay result given the patient's current status on Eliquis.  This is a resulted as greater than 0.6, which typically does not indicate initiation of the infusion.  Determination with attending physician and nursing staff made to initiate infusion without initial bolus given the result of the initial assay.  Patient and father were updated on plan for admission to the hospital and they were in agreement.    ED Course:  ED Course as of 12/12/24 0607   Wed Dec 11, 2024   2322 EKG obtained today interpreted by me shows normal sinus rhythm with a ventricular rate of 85 bpm.  Upright axis, normal intervals, no evidence of ST changes or STEMI.  T waves are appropriate.  There is T wave inversion in V3 but given that this is only in this lead and has a negative QRS as well which is different from the surrounding precordial leads, I suspect that this is due to lead placement which was likely difficult given the patient's acute pain at the time of EKG.  There are also T wave inversions in lead III which is seen previously.  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  The patient was informed of her diagnosis.  The patient was given the opportunity to ask questions and I answered them. The patient agreed to be admitted to the hospital.    Procedures   Procedures    Patient seen and discussed with ED attending physician.    Elza Spivey,   Emergency Medicine

## 2024-12-12 NOTE — ED PROVIDER NOTES
History of Present Illness     History provided by: Patient and Family Member  Limitations to History:  Patient has severe aphasia which severely limits history and she frequently responds with yes or no without necessarily meaning either   External Records Reviewed with Brief Summary:  Previous discharge summary From the ED approximately 1 year ago.  At that time patient had hit her foot on a stair and had a small fall.  She received x-rays and was worked up for head injury    HPI:  Loan Osborne is a 54 y.o. 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  Patient has rigid paralysis which causes contractures.  She also frequently wears a plastic splint to help straighten her foot which has a chronic inversion when not otherwise stimulated.  She is normally ambulatory and weightbearing at home.  She is coming in today with acute right lower extremity pain.  She reportedly began screaming at home for severe pain and continued to be nearly inconsolable.  Her family called her father to pick her up and take her to the ED.  He states that this pain is severe and she has never had pain this severe before.  Her father notes that her plastic splint is broken and a properly fitting more permanent boot is being made for her.  Although patient is extremely difficult to receive a history from, she denies any history of trauma including minor incidents to the painful extremity.  She notes pain in the right thigh and ankle with mild pain in the foot.  Denies pain in the knee and hip on that side.  Otherwise has no complaints.  Her left leg is not painful, denies CP/SOB, N/V, fevers    Physical Exam   Triage vitals:  T 36.6 °C (97.9 °F)  HR 98  BP (!) 176/92  RR (!) 25  O2 100 % None (Room air)    Physical Exam  Constitutional:       Comments: Patient was initially in severe distress and screaming while in  the waiting room and triage.  She eventually calmed down with some oxygen which was not medically needed but was soothing and decreased stimulation of her right foot.   HENT:      Head: Normocephalic and atraumatic.   Cardiovascular:      Rate and Rhythm: Normal rate and regular rhythm.      Pulses: Normal pulses.      Heart sounds: Normal heart sounds. No murmur heard.     No friction rub. No gallop.   Pulmonary:      Effort: Pulmonary effort is normal. No respiratory distress.      Breath sounds: Normal breath sounds. No stridor. No wheezing, rhonchi or rales.   Chest:      Chest wall: No tenderness.   Musculoskeletal:      Right hip: Normal. No deformity or tenderness.      Left hip: Normal. No deformity or tenderness.      Right upper leg: Tenderness (There is tenderness in the right thigh but I am not able to discern the location of her pain) present. No swelling or deformity.      Left upper leg: Normal. No swelling, deformity or tenderness.      Right knee: No swelling, deformity, effusion or erythema. Decreased range of motion (Rigid secondary to stroke.  Family states that this is baseline). No tenderness (Knee was nontender and although patient only had a few degrees of ROM in the right knee, was not painful).      Left knee: Normal. No swelling, deformity, effusion or erythema. Normal range of motion. No tenderness.      Right lower leg: Tenderness (Extremely mild tenderness in the calf muscle with squeeze) present. No deformity or bony tenderness. Edema (There appears to be edema in the right lower extremity to the mid calf) present.      Left lower leg: Normal. No swelling, deformity, tenderness or bony tenderness. No edema.      Right ankle: Swelling (Circumferential swelling around the right ankle which appears to be worse on the medial side) present. No deformity (Does not appear acutely deformed but there is a baseline foot inversion consistent with family description), ecchymosis or lacerations.  Tenderness (Ankle is extremely tender throughout.  Patient does not appear to have increased tenderness over the malleoli) present. Decreased range of motion (ROM is decreased partly due to pain but is also extremely rigid consistent with rigid paralysis secondary to stroke). Normal pulse.      Left ankle: Normal. No swelling, deformity, ecchymosis or lacerations. No tenderness. Normal range of motion. Normal pulse.      Right foot: Swelling (There is some swelling/edema to the right foot somewhat diffusely) and tenderness (difficult to discern location. There is some swelling throughout the right foot but I am unable to definitively discern location although it appears to be more painful along the medial edge of the foot worst along the first metatarsal) present. Normal pulse.      Left foot: Normal. No swelling. Normal pulse.      Comments: No pain in the legs with logrolling motion  Of note, the patient did have a plastic splint in place which family states was there in order to assist with her permanently inverted foot due to her rigidity from stroke.  The splint was broken on the medial side of her foot which was causing a broken corner to be firmly placed into her ankle.  This appeared acutely painful and the broken piece was notably sharp and painful through my glove.  When I removed this, patient did appear to be relieved and agreed that it improved her pain when I removed it.   The swelling around the patient's right ankle is somewhat soft and is of normal temperature.  Not erythematous, does not appear acutely infected.    Skin:     General: Skin is warm and dry.      Capillary Refill: Capillary refill takes less than 2 seconds.   Neurological:      Mental Status: She is alert.      Comments: Unable to assess orientation but appears to be at baseline per family          Medical Decision Making & ED Course   Medical Decision Makin y.o. female with above history and physical notable for severe pain and  tenderness in multiple portions of the right lower extremity.  Pain appears to be localized in the ankle and foot with some additional pain in the right thigh.  Due to patient's aphasia will obtain imaging of the entire right lower extremity due to concern that something may be missed due to poor communication ability.  There is also swelling/edema in the right lower extremity to the mid calf.  In this patient who is ambulatory but unable to actively use her muscles in the right lower extremity, I have suspicion that she will have chronic stasis due to failure to use muscular pump system to return venous blood from the right lower extremity.  For this reason we will obtain venous duplex ultrasound of the right lower extremity To evaluate for DVT.   Patient appeared to have significant pain relief after removal of broken splint.  I have a high suspicion that a large portion of her pain was caused by this broken splint in the corner digging into her ankle.  That said, we will continue to complete her workup.    Patient was signed out to the next team pending DVT study and dispo    ED Course:  ED Course as of 12/11/24 2322   Wed Dec 11, 2024   2322 EKG obtained today interpreted by me shows normal sinus rhythm with a ventricular rate of 85 bpm.  Upright axis, normal intervals, no evidence of ST changes or STEMI.  T waves are appropriate.  There is T wave inversion in V3 but given that this is only in this lead and has a negative QRS as well which is different from the surrounding precordial leads, I suspect that this is due to lead placement which was likely difficult given the patient's acute pain at the time of EKG.  There are also T wave inversions in lead III which is seen previously.  Baseline EKG [CJ]      ED Course User Index  [CJ] Yemi Taylor MD       ---    Differential diagnoses considered include but are not limited to: Pain secondary to broken and malpositioned ankle splint, chronic pain exacerbation,  osteoporotic fracture (due to deconditioning of the leg), traumatic fracture, DVT, joint effusion/hemarthrosis/septic joint     Social Determinants of Health which Significantly Impact Care: None identified     EKG Independent Interpretation: EKG interpreted by myself. Please see ED Course for full interpretation.    Independent Result Review and Interpretation: Relevant laboratory and radiographic results were reviewed and independently interpreted by myself.  As necessary, they are commented on in the ED Course.    Chronic conditions affecting the patient's care: As documented above in MDM    The patient was discussed with the following consultants/services: None    Care Considerations: As documented above in MDM      Disposition   Patient was signed out to Dr. Moreira at 2300 pending completion of their work-up.  Please see the next provider's transition of care note for the remainder of the patient's care.     Procedures   Procedures    This was a shared visit with an ED attending.  The patient was seen and discussed with the ED attending    Yemi Taylor MD  Emergency Medicine     Yemi Taylor MD  Resident  12/11/24 1034

## 2024-12-12 NOTE — ED TRIAGE NOTES
Patient brought in by family for leg pain. Patient is inconsolable in triage, yelling and screaming. Family at bedside state she was complaining of leg pain earlier today and then started screaming, patient has limited communication due to hx of stroke. After being placed on O2 in triage for comfort, patient calmed down enough to say her right ankle hurts and then started crying and screaming again.

## 2024-12-13 LAB
ALBUMIN SERPL BCP-MCNC: 4 G/DL (ref 3.4–5)
ALBUMIN SERPL BCP-MCNC: 4 G/DL (ref 3.4–5)
ALP SERPL-CCNC: 123 U/L (ref 33–110)
ALT SERPL W P-5'-P-CCNC: 39 U/L (ref 7–45)
ANION GAP SERPL CALC-SCNC: 15 MMOL/L (ref 10–20)
ANION GAP SERPL CALC-SCNC: 19 MMOL/L (ref 10–20)
APTT PPP: 32 SECONDS (ref 27–38)
AST SERPL W P-5'-P-CCNC: 22 U/L (ref 9–39)
BASOPHILS # BLD AUTO: 0.02 X10*3/UL (ref 0–0.1)
BASOPHILS NFR BLD AUTO: 0.3 %
BILIRUB DIRECT SERPL-MCNC: 0.2 MG/DL (ref 0–0.3)
BILIRUB SERPL-MCNC: 1 MG/DL (ref 0–1.2)
BUN SERPL-MCNC: 4 MG/DL (ref 6–23)
BUN SERPL-MCNC: 4 MG/DL (ref 6–23)
CALCIUM SERPL-MCNC: 9.5 MG/DL (ref 8.6–10.6)
CALCIUM SERPL-MCNC: 9.5 MG/DL (ref 8.6–10.6)
CHLORIDE SERPL-SCNC: 105 MMOL/L (ref 98–107)
CHLORIDE SERPL-SCNC: 105 MMOL/L (ref 98–107)
CO2 SERPL-SCNC: 21 MMOL/L (ref 21–32)
CO2 SERPL-SCNC: 25 MMOL/L (ref 21–32)
CREAT SERPL-MCNC: 0.52 MG/DL (ref 0.5–1.05)
CREAT SERPL-MCNC: 0.54 MG/DL (ref 0.5–1.05)
EGFRCR SERPLBLD CKD-EPI 2021: >90 ML/MIN/1.73M*2
EGFRCR SERPLBLD CKD-EPI 2021: >90 ML/MIN/1.73M*2
EOSINOPHIL # BLD AUTO: 0.03 X10*3/UL (ref 0–0.7)
EOSINOPHIL NFR BLD AUTO: 0.4 %
ERYTHROCYTE [DISTWIDTH] IN BLOOD BY AUTOMATED COUNT: 13.8 % (ref 11.5–14.5)
GLUCOSE SERPL-MCNC: 114 MG/DL (ref 74–99)
GLUCOSE SERPL-MCNC: 114 MG/DL (ref 74–99)
HCT VFR BLD AUTO: 39.3 % (ref 36–46)
HGB BLD-MCNC: 14 G/DL (ref 12–16)
IMM GRANULOCYTES # BLD AUTO: 0.02 X10*3/UL (ref 0–0.7)
IMM GRANULOCYTES NFR BLD AUTO: 0.3 % (ref 0–0.9)
INR PPP: 1.3 (ref 0.9–1.1)
LYMPHOCYTES # BLD AUTO: 0.94 X10*3/UL (ref 1.2–4.8)
LYMPHOCYTES NFR BLD AUTO: 12.3 %
MAGNESIUM SERPL-MCNC: 1.9 MG/DL (ref 1.6–2.4)
MCH RBC QN AUTO: 31.5 PG (ref 26–34)
MCHC RBC AUTO-ENTMCNC: 35.6 G/DL (ref 32–36)
MCV RBC AUTO: 89 FL (ref 80–100)
MONOCYTES # BLD AUTO: 0.64 X10*3/UL (ref 0.1–1)
MONOCYTES NFR BLD AUTO: 8.4 %
NEUTROPHILS # BLD AUTO: 5.99 X10*3/UL (ref 1.2–7.7)
NEUTROPHILS NFR BLD AUTO: 78.3 %
NRBC BLD-RTO: 0 /100 WBCS (ref 0–0)
PHOSPHATE SERPL-MCNC: 3.4 MG/DL (ref 2.5–4.9)
PHOSPHATE SERPL-MCNC: 3.4 MG/DL (ref 2.5–4.9)
PLATELET # BLD AUTO: 223 X10*3/UL (ref 150–450)
POTASSIUM SERPL-SCNC: 3.6 MMOL/L (ref 3.5–5.3)
POTASSIUM SERPL-SCNC: 3.6 MMOL/L (ref 3.5–5.3)
PROT SERPL-MCNC: 6.6 G/DL (ref 6.4–8.2)
PROTHROMBIN TIME: 14.4 SECONDS (ref 9.8–12.8)
RBC # BLD AUTO: 4.44 X10*6/UL (ref 4–5.2)
SODIUM SERPL-SCNC: 141 MMOL/L (ref 136–145)
SODIUM SERPL-SCNC: 141 MMOL/L (ref 136–145)
WBC # BLD AUTO: 7.6 X10*3/UL (ref 4.4–11.3)

## 2024-12-13 PROCEDURE — 97165 OT EVAL LOW COMPLEX 30 MIN: CPT | Mod: GO

## 2024-12-13 PROCEDURE — 80069 RENAL FUNCTION PANEL: CPT | Mod: CCI

## 2024-12-13 PROCEDURE — 80069 RENAL FUNCTION PANEL: CPT

## 2024-12-13 PROCEDURE — 2500000005 HC RX 250 GENERAL PHARMACY W/O HCPCS

## 2024-12-13 PROCEDURE — 85610 PROTHROMBIN TIME: CPT

## 2024-12-13 PROCEDURE — 84075 ASSAY ALKALINE PHOSPHATASE: CPT

## 2024-12-13 PROCEDURE — 85613 RUSSELL VIPER VENOM DILUTED: CPT

## 2024-12-13 PROCEDURE — 2500000001 HC RX 250 WO HCPCS SELF ADMINISTERED DRUGS (ALT 637 FOR MEDICARE OP)

## 2024-12-13 PROCEDURE — 36415 COLL VENOUS BLD VENIPUNCTURE: CPT

## 2024-12-13 PROCEDURE — 83735 ASSAY OF MAGNESIUM: CPT

## 2024-12-13 PROCEDURE — 51702 INSERT TEMP BLADDER CATH: CPT

## 2024-12-13 PROCEDURE — 84100 ASSAY OF PHOSPHORUS: CPT

## 2024-12-13 PROCEDURE — 81241 F5 GENE: CPT

## 2024-12-13 PROCEDURE — 97530 THERAPEUTIC ACTIVITIES: CPT | Mod: GO

## 2024-12-13 PROCEDURE — 85025 COMPLETE CBC W/AUTO DIFF WBC: CPT

## 2024-12-13 PROCEDURE — 99232 SBSQ HOSP IP/OBS MODERATE 35: CPT | Performed by: STUDENT IN AN ORGANIZED HEALTH CARE EDUCATION/TRAINING PROGRAM

## 2024-12-13 PROCEDURE — 1100000001 HC PRIVATE ROOM DAILY

## 2024-12-13 RX ORDER — DICLOFENAC SODIUM 10 MG/G
4 GEL TOPICAL 4 TIMES DAILY PRN
Status: DISCONTINUED | OUTPATIENT
Start: 2024-12-13 | End: 2024-12-17 | Stop reason: SDUPTHER

## 2024-12-13 RX ORDER — BACLOFEN 10 MG/1
10 TABLET ORAL 3 TIMES DAILY
Status: DISCONTINUED | OUTPATIENT
Start: 2024-12-13 | End: 2024-12-17 | Stop reason: HOSPADM

## 2024-12-13 RX ORDER — POTASSIUM CHLORIDE 1.5 G/1.58G
40 POWDER, FOR SOLUTION ORAL ONCE
Status: COMPLETED | OUTPATIENT
Start: 2024-12-13 | End: 2024-12-13

## 2024-12-13 ASSESSMENT — COGNITIVE AND FUNCTIONAL STATUS - GENERAL
CLIMB 3 TO 5 STEPS WITH RAILING: TOTAL
DRESSING REGULAR LOWER BODY CLOTHING: A LOT
DRESSING REGULAR UPPER BODY CLOTHING: A LOT
EATING MEALS: A LITTLE
MOVING FROM LYING ON BACK TO SITTING ON SIDE OF FLAT BED WITH BEDRAILS: A LITTLE
TOILETING: A LOT
DRESSING REGULAR UPPER BODY CLOTHING: A LOT
PERSONAL GROOMING: A LOT
DRESSING REGULAR LOWER BODY CLOTHING: A LOT
DAILY ACTIVITIY SCORE: 13
DAILY ACTIVITIY SCORE: 15
EATING MEALS: A LITTLE
STANDING UP FROM CHAIR USING ARMS: TOTAL
MOVING FROM LYING ON BACK TO SITTING ON SIDE OF FLAT BED WITH BEDRAILS: A LITTLE
MOBILITY SCORE: 10
EATING MEALS: A LITTLE
HELP NEEDED FOR BATHING: A LOT
HELP NEEDED FOR BATHING: A LOT
MOVING TO AND FROM BED TO CHAIR: TOTAL
DRESSING REGULAR LOWER BODY CLOTHING: A LOT
CLIMB 3 TO 5 STEPS WITH RAILING: TOTAL
DRESSING REGULAR UPPER BODY CLOTHING: A LITTLE
PERSONAL GROOMING: A LITTLE
PERSONAL GROOMING: A LOT
DAILY ACTIVITIY SCORE: 13
WALKING IN HOSPITAL ROOM: TOTAL
MOVING TO AND FROM BED TO CHAIR: TOTAL
STANDING UP FROM CHAIR USING ARMS: TOTAL
TURNING FROM BACK TO SIDE WHILE IN FLAT BAD: A LITTLE
TOILETING: A LOT
TURNING FROM BACK TO SIDE WHILE IN FLAT BAD: A LITTLE
HELP NEEDED FOR BATHING: A LOT
MOBILITY SCORE: 10
WALKING IN HOSPITAL ROOM: TOTAL
TOILETING: A LOT

## 2024-12-13 ASSESSMENT — PAIN - FUNCTIONAL ASSESSMENT
PAIN_FUNCTIONAL_ASSESSMENT: 0-10

## 2024-12-13 ASSESSMENT — PAIN SCALES - GENERAL
PAINLEVEL_OUTOF10: 0 - NO PAIN
PAINLEVEL_OUTOF10: 0 - NO PAIN

## 2024-12-13 ASSESSMENT — PAIN DESCRIPTION - LOCATION
LOCATION: LEG
LOCATION: LEG

## 2024-12-13 ASSESSMENT — PAIN SCALES - WONG BAKER
WONGBAKER_NUMERICALRESPONSE: HURTS LITTLE BIT
WONGBAKER_NUMERICALRESPONSE: HURTS LITTLE BIT

## 2024-12-13 ASSESSMENT — PAIN DESCRIPTION - ORIENTATION
ORIENTATION: RIGHT
ORIENTATION: RIGHT

## 2024-12-13 ASSESSMENT — ACTIVITIES OF DAILY LIVING (ADL)
BATHING_ASSISTANCE: MODERATE
ADL_ASSISTANCE: INDEPENDENT

## 2024-12-13 NOTE — PROGRESS NOTES
Physical Therapy                 Therapy Communication Note    Patient Name: Loan Osborne  MRN: [id number]  Department: [facility]  Room: 5522/5522-A  Today's Date: 12/13/2024     Discipline: Physical Therapy    PT Missed Visit: Yes     Missed Visit Reason: Missed Visit Reason:  (Upon PT entry, MD entered room to speak with patient and to speak w family over the phone. Will reattempt PT visit at later time/date.)    Missed Time: Attempt

## 2024-12-13 NOTE — PROGRESS NOTES
Loan Osborne is a 54 y.o. female on day 1 of admission presenting with Acute deep vein thrombosis (DVT) of femoral vein of both lower extremities (Multi).      Subjective     She is retaining urine, straight cath overnight with 537mL out, she states she does not have urinary issues at home.    Leg pain on R side has improved.        Objective     Last Recorded Vitals  /84 (BP Location: Left arm, Patient Position: Lying)   Pulse 70   Temp 36.7 °C (98.1 °F) (Temporal)   Resp 19   Wt 66.3 kg (146 lb 3.2 oz)   SpO2 97%   Intake/Output last 3 Shifts:    Intake/Output Summary (Last 24 hours) at 12/13/2024 1122  Last data filed at 12/13/2024 0800  Gross per 24 hour   Intake 240 ml   Output 350 ml   Net -110 ml       Admission Weight  Weight: 69.9 kg (154 lb) (12/11/24 2007)    Daily Weight  12/13/24 : 66.3 kg (146 lb 3.2 oz)    Image Results  XR ankle right 3+ views, XR foot right 1-2 views  Narrative: Interpreted By:  Maxwell Peres,   STUDY:  Right ankle and foot dated 12/12/2024.      INDICATION:  Signs/Symptoms:Ankle pain; Signs/Symptoms:Foot pain      COMPARISON:  Radiographs dated 12/11/2024.      ACCESSION NUMBER(S):  [id number]; [id number]      ORDERING CLINICIAN:  BERNARDO NAPIER      TECHNIQUE:  Three views radiographs of the right ankle and foot.      FINDINGS:  Foot radiographs are limited due to positioning. Bones are  demineralized. No fracture or dislocation is evident. The ankle  mortise is congruent and the tibial plafond and talar dome are  intact.    No ankle joint effusion is evident. No soft tissue gas or  radiopaque foreign body is evident.      Impression: No osseous injury is evident. If there remains concern for osseous  injury, CT is recommended.      MACRO:  None      Signed by: Maxwell Peres 12/12/2024 11:46 AM  Dictation workstation:   [id number]  CT angio chest for pulmonary embolism  Narrative: STUDY:  CT Angiogram of the Chest; 12/12/2024 at 2:34 AM.  INDICATION:  Bilateral  lower extremity DVT, shortness of breath.  COMPARISON:  None available.  ACCESSION NUMBER(S):  [id number]  ORDERING CLINICIAN:  MARGARITA MCPHERSON  TECHNIQUE:  CTA of the chest was performed with intravenous contrast.   Images are reviewed and processed at a workstation according to the CT  angiogram protocol with 3-D and/or MIP post processing imaging  generated.  Omnipaque 350 90 mL was administered intravenously.  Automated mA/kV exposure control was utilized and patient examination  was performed in strict accordance with principles of ALARA.  FINDINGS:  Pulmonary arteries are adequately opacified without acute or chronic  filling defects.  The thoracic aorta is normal in course and caliber  without dissection or aneurysm.  The heart is normal in size without pericardial effusion.  Thoracic  lymph nodes are not enlarged.  There is no pleural effusion, pleural thickening, or pneumothorax.   The airways are patent.  Lungs are clear without consolidation, interstitial disease, or  suspicious nodules.  Upper abdomen demonstrates no acute pathology.  There are no acute fractures.  No suspicious bony lesions.  Impression: No evidence of pulmonary embolism.  Signed by Quinten Santana MD  Lower extremity venous duplex right  Narrative: Interpreted By:  Bruno Lr,  and Phyllis Lux   STUDY:  [facility] US LOWER EXTREMITY VENOUS DUPLEX RIGHT;  12/11/2024 10:58 pm      INDICATION:  Signs/Symptoms:Immobilization from stroke, swelling.          COMPARISON:  Duplex lower extremity ultrasound dated 08/11/2020      ACCESSION NUMBER(S):  [id number]      ORDERING CLINICIAN:  AGUSTIN VARGAS      TECHNIQUE:  Vascular ultrasound of the bilateral lower extremities was performed.  Real-time compression views as well as Gray scale, color Doppler and  spectral Doppler waveform analysis was performed.      FINDINGS:  Evaluation of the visualized portions of the bilateral common femoral  vein, proximal, mid, and distal femoral vein, and  popliteal vein were  performed/attempted.      Limitations:  Examination is limited by lack of patient mobility and  small vessels.      The right proximal femoral vein and the left common femoral vein  demonstrate partial compressibility with echogenic material seen  within the lumen. The right common femoral vein and right posterior  tibial vein are visualized and are compressible. The remainder of the  lower extremity veins were not visualized due to the limitations of  the study.          Impression: Examination is limited by lack of patient mobility and small vessels.  Within these limitations:      Partial compressibility of the right proximal femoral vein and left  common femoral vein, most compatible with thrombus.      I personally reviewed the images/study and I agree with the findings  as stated above by resident physician, Jose J Ferguson MD. This study  was interpreted at [facility],  [place], [place].      MACRO:  Critical Finding:  Thrombosis. Notification was initiated on  12/11/2024 at 11:37 pm by  Jose J Ferguson.  (**-[facility]-**) Instructions:      Signed by: Bruno Lr 12/12/2024 12:01 AM  Dictation workstation:   [id number]      Physical Exam      Gen: well appearing, A+Ox3, in no acute distress  HEENT: sclera anicteric, no conjunctival injection, MMM  Resp: CTAB, normal breath sounds, no wheezing/crackles  CV: RRR, normal S1/S2  GI: Suprapubic tenderness/ Fullness, non-distended, no rebound or guarding  Extremities/MSK: warm and well perfused, no edema bilateral, Right thigh pain  Spastic, contracted RUE  Neuro: A+Ox3, no focal deficits, no asterixis  Skin: warm and dry, no lesions, no rashes        Assessment & Plan  Acute deep vein thrombosis (DVT) of femoral vein of both lower extremities (Multi)    Loan Osborne is a 54 y.o. 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 Presenting to The ED with the concern of acute right lower extremity pain. Found to have an orthotic brace broken off and poking into her leg which was initially thought to be the source of her pain. DVT study revealed bilateral DVTs - R proximal femoral and L common femoral thrombus, a CT PE is neg. Back on Eliquis.     Updates 12/13:  - Going to call family for more collateral information on home medication regimen  - Restarting Baclofen at reduced dose, 10mg TID, to see if R spastic leg pain improves  - Milan cath for acute urinary retention while here in hospital, will likely void trial tmmrw     # Acute Bilateral DVT while on Apixaban  # Failure of Apixaban Therapy  # DVT/PE (2017, 2019)   :: LLE DVT in 2019 was attributed to Eliquis, charted as non compliance? Unsure if the patient remained on AC after the first event or it was discontinued , CT venogram during that admission  with evidence of chronic appearance left common iliac and external iliac thrombus   :: June 2017 she had a deep vein thrombosis and pulmonary embolus. She was placed on Xarelto at that time; a prior note states it was stopped in September 2017 after a CT scan.   :: US Doppler Temo LE: Examination is limited by lack of patient mobility and small vessels.  Within these limitations: Partial compressibility of the right proximal femoral vein and left  common femoral vein, most compatible with thrombu.  :: CTPE neg     - Back on Eliquis  - XR negative for fractures of R foot  - Calling family for more collateral on eliquis and leg pain     #\"Cryptogenic\" Stroke 9/2016  :: L MCA, L M1 occlusion, residual severe aphasia and spastic Right hemiparesis.   :: Anticardiolipin antibody, antinuclear antibody, beta-2 glycoprotein DRVVT - all normal.   Workup was not revealing for etiology of her stroke     - Continue Atorvastatin, continue Baclofen from home pending urine output  - Continue Home PT  - as AFO is broken, plan to order a new one " before discharge     # Acute urine retention, Dysuria  # Hx of fibroids with previous episodes of menorrhagia   No surgical intervention done for fibroids- per chart review the patient was not agreeable to it.   Likely iso of acute pain and opioids, UA to rule out infection. Known fibroid can be contributing,     - follow up bladder scan   - UA pending  - Consider a US abdomen/ Gynecological US for evaluation     #High blood pressure reading  :: Not known to have hypertension  :: SBP in the 150s , iso of pain and discomfort.      - CTM, consider starting an antihypertensive medication of BP continues to be high during the admission      # Elevated Liver enzymes  :: ALT 55, AST 47 not previously noted to be elevated. last checked 3/2024  ::  ( noted to be elevated within the same range since 2021)  :: , TG 73, Chol 230 3/2024, HbA1c 5.1  :: no abdominal pain, No vomiting, some nausea associated with IV morphine     - Consider liver US if increase in AM  - hepatitis serology negative   - repeat LDL, HbA1c      # Depression   sertraline (Zoloft) 100 mg         N: regular diet  A: PIV  O2: RA  DVT ppx: Heparin gtt  GI ppx: Not indicated  Code Status: Full code  Contact:  MARGRET VÁZQUEZ (Daughter)  [phone] (Mobile)               Sampson Green MD

## 2024-12-13 NOTE — CARE PLAN
The patient's goals for the shift include      The clinical goals for the shift include patient will remain safe and free from injjury      Problem: Pain - Adult  Goal: Verbalizes/displays adequate comfort level or baseline comfort level  Outcome: Progressing     Problem: Safety - Adult  Goal: Free from fall injury  Outcome: Progressing     Problem: Chronic Conditions and Co-morbidities  Goal: Patient's chronic conditions and co-morbidity symptoms are monitored and maintained or improved  Outcome: Progressing     Problem: Skin  Goal: Decreased wound size/increased tissue granulation at next dressing change  Outcome: Progressing  Goal: Prevent/manage excess moisture  Outcome: Progressing  Goal: Prevent/minimize sheer/friction injuries  Outcome: Progressing     Problem: Pain  Goal: Takes deep breaths with improved pain control throughout the shift  Outcome: Progressing  Goal: Free from opioid side effects throughout the shift  Outcome: Progressing

## 2024-12-13 NOTE — CONSULTS
"Nutrition Initial Assessment:   Nutrition Assessment    --->Reason for Assessment: Admission nursing screening    Patient is a 54 y.o. 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    Nutrition History:  This service met with pt earlier today, pt sitting up in bed at time of visit with her.    Of note, pt able to follow commands and answer questions with a few words, though difficult to obtain significant amount of nutrition history, considering aphasia. Family not present during visit.    Tells appetite has been decreased x several weeks. Does like to drink Ensure or Boost supplements at home.    Since admit, appetite remains decreased. Pt did report she ate a little of her breakfast meal--per nursing documentation, pt consumed 25%.    --Vitamin/Herbal Supplement Use: per review of home meds in EMR: ferrous sulfate, MVI  --Food Allergies/Intolerances: none noted in review of EMR       Anthropometrics:  Height: 167.6 cm (5' 6\")   Weight: 66.3 kg (146 lb 3.2 oz)   BMI (Calculated): 23.61  IBW/kg (Dietitian Calculated): 59.1 kg  Percent of IBW: 112 %       Weight History:   --Pt thinks she has been losing weight recently, though unsure as to how much/time frame.    Wt Readings per review of EMR:   12/13/24 66.3 kg (146 lb 3.2 oz)   11/01/24 69.9 kg (154 lb)--->5% wt loss from this date to present (not significant)   05/23/24 69.9 kg (154 lb)   04/26/24 68 kg (150 lb)   03/05/24 68.5 kg (151 lb)   12/03/23 65.3 kg (144 lb)   05/23/23 65.4 kg (144 lb 3.2 oz)   08/12/22 62.1 kg (137 lb)   03/02/22 65.3 kg (144 lb)   06/07/21 65.8 kg (145 lb)   05/12/21 65.8 kg (145 lb)   03/09/21 66.7 kg (147 lb)   02/09/21 59 kg (130 lb)   03/17/20 60.5 kg (133 lb 6.1 oz)   02/18/20 61.2 kg (134 lb 14.7 oz)     Nutrition Focused Physical Exam Findings:  Subcutaneous Fat Loss:   Orbital Fat Pads: Well nourished (slightly bulging fat pads)  Buccal Fat Pads: Well nourished (full, " rounded cheeks)  Triceps: Well nourished (ample fat tissue)  Ribs: Defer  Muscle Wasting:  Temporalis: Well nourished (well-defined muscle)  Pectoralis (Clavicular Region): Well nourished (clavicle not visible)  Deltoid/Trapezius: Well nourished (rounded appearance at arm, shoulder, neck)  Interosseous: Well nourished (muscle bulges)  Trapezius/Infraspinatus/Supraspinatus (Scapular Region): Defer  Quadriceps: Defer  Gastrocnemius: Defer  Edema:  Edema: none  Physical Findings:  Hair: Negative  Eyes: Negative  Nails: Negative  Skin: Negative    Nutrition Significant Labs:  CBC Trend:   Results from last 7 days   Lab Units 12/13/24  0618 12/12/24  0431 12/12/24  0003   WBC AUTO x10*3/uL 7.6 8.4 5.9   RBC AUTO x10*6/uL 4.44 4.61 4.77   HEMOGLOBIN g/dL 14.0 14.4 14.9   HEMATOCRIT % 39.3 39.7 40.7   MCV fL 89 86 85   PLATELETS AUTO x10*3/uL 223 232 233   BMP Trend:   Results from last 7 days   Lab Units 12/13/24  0618 12/12/24  0431 12/12/24  0003   GLUCOSE mg/dL 114* 131* 79   CALCIUM mg/dL 9.5 9.5 9.4   SODIUM mmol/L 141 142 144   POTASSIUM mmol/L 3.6 3.8 3.6   CO2 mmol/L 25 23 17*   CHLORIDE mmol/L 105 106 107   BUN mg/dL 4* 7 7   CREATININE mg/dL 0.54 0.48* 0.48*   Liver Function Trend:   Results from last 7 days   Lab Units 12/13/24  0618 12/12/24  0431 12/12/24  0003   ALK PHOS U/L 123* 136* 134*   AST U/L 22 41* 47*   ALT U/L 39 53* 55*   BILIRUBIN TOTAL mg/dL 1.0 0.7 0.5   Renal Lab Trend:   Results from last 7 days   Lab Units 12/13/24  0618 12/12/24  0431 12/12/24  0003 12/12/24  0003   POTASSIUM mmol/L 3.6 3.8  --  3.6   PHOSPHORUS mg/dL 3.4 3.8   < >  --    SODIUM mmol/L 141 142  --  144   MAGNESIUM mg/dL 1.90 2.06   < >  --    EGFR mL/min/1.73m*2 >90 >90  --  >90   BUN mg/dL 4* 7  --  7   CREATININE mg/dL 0.54 0.48*  --  0.48*    < > = values in this interval not displayed.     Medications:  Scheduled medications  apixaban, 10 mg, oral, BID   Followed by  [START ON 12/19/2024] apixaban, 5 mg, oral,  BID  atorvastatin, 80 mg, oral, Daily  baclofen, 10 mg, oral, TID  ferrous sulfate (325 mg ferrous sulfate), 65 mg of iron, oral, Daily with breakfast  lidocaine, 1 patch, transdermal, Daily    PRN medications  PRN medications: acetaminophen, oxyCODONE, polyethylene glycol    I/O:   --Last documented BM on 12/12/24; Stool Appearance: Loose, Watery (12/12/24 2348)    Dietary Orders (From admission, onward)       Start     Ordered    12/13/24 1208  Oral nutritional supplements  Until discontinued        Comments: chocolate   Question Answer Comment   Deliver with All meals    Select supplement: Ensure Plus        12/13/24 1207    12/12/24 1850  May Participate in Room Service With Assistance  ( ROOM SERVICE MAY PARTICIPATE WITH ASSISTANCE)  Once        Question:  .  Answer:  Yes    12/12/24 1849    12/12/24 0310  Adult diet Regular  Diet effective now        Question:  Diet type  Answer:  Regular    12/12/24 0309                Estimated Needs:   Total Energy Estimated Needs (kCal): ~7708-9296  Method for Estimating Needs: 66 x ~25  Total Protein Estimated Needs (g): 65-80  Method for Estimating Needs: 66 x ~1.0-1.2g/kg  Total Fluid Estimated Needs (mL): 1ml/kcal or per MD/team        Nutrition Diagnosis   Malnutrition Diagnosis  Patient has Malnutrition Diagnosis: No (Based on information available at present, do not feel malnutrition present at this time. Pt reports decreased PO, though no significant wt, muscle, or adipose losses noted.)    Nutrition Diagnosis  Patient has Nutrition Diagnosis: Yes  Diagnosis Status (1): New  Nutrition Diagnosis 1: Inadequate oral intake  Related to (1): decreased PO  As Evidenced by (1): pt report of declining PO x the last several weeks, only consuming 25% of her breakfast meal, need for oral nutrition supplements    Additional Assessment Information: Did offer to provide oral nutrition supplements, considering decreased PO--pt agreeable to Ensure Plus (chocolate).        Nutrition Interventions/Recommendations     1. Continue Regular Diet, only as tolerated  --RDN placed order for Ensure Plus TID for added nutrition  --Considering h/o stroke, if any c/f dysphagia during admit, recommend formal SLP eval    2. Please weigh pt at least once/week (standing preferred, if feasible)        Nutrition Prescription for Oral Nutrition: ~1490-9163 kcals/day, 65-80 g pro/day        Nutrition Interventions:   Interventions: Meals and snacks, Medical food supplement  Meals and Snacks: General healthful diet  Goal: Regular Diet  Medical Food Supplement: Commercial beverage  Goal: Ensure Plus TID (350 kcals, 13g pro each)    Nutrition Education: n/a       Nutrition Monitoring and Evaluation   Food/Nutrient Related History Monitoring  Monitoring and Evaluation Plan: Amount of food  Amount of Food: Estimated amout of food, Medical food intake  Criteria: consume >50% of meals/snacks/supplements    Body Composition/Growth/Weight History  Monitoring and Evaluation Plan: Weight  Weight: Measured weight  Criteria: maintain stable wt    Biochemical Data, Medical Tests and Procedures  Monitoring and Evaluation Plan: Electrolyte/renal panel, Glucose/endocrine profile  Electrolyte and Renal Panel: Magnesium, Sodium, Potassium, Phosphorus  Criteria: WNL  Glucose/Endocrine Profile: Glucose, casual, Glucose, fasting  Criteria: WNL          Time Spent (min): 45 minutes

## 2024-12-13 NOTE — PROGRESS NOTES
Occupational Therapy    Evaluation/Treatment    Patient Name: Loan Osborne  MRN: [id number]  Department: [facility]  Room: 5522/5522-A  Today's Date: 12/13/24  Time Calculation  Start Time: 1042  Stop Time: 1105  Time Calculation (min): 23 min       Assessment:  OT Assessment: Pt will benefit from continued skilled OT to increase independence in ADLs, fucntional mobility, activity tolerance, safety, srength, and cognition.  Prognosis: Good  Barriers to Discharge Home: Physical needs, Cognition needs, Caregiver assistance  Evaluation/Treatment Tolerance: Patient limited by pain  Medical Staff Made Aware: Yes  End of Session Communication: Bedside nurse  End of Session Patient Position: Bed, 3 rail up, Alarm on  OT Assessment Results: Decreased ADL status, Decreased upper extremity strength, Decreased safe judgment during ADL, Decreased endurance, Decreased functional mobility, Decreased IADLs  Prognosis: Good  Evaluation/Treatment Tolerance: Patient limited by pain  Medical Staff Made Aware: Yes  Strengths: Attitude of self  Barriers to Participation: Comorbidities, Other (Comment) (pain)  Plan:  Treatment Interventions: ADL retraining, Functional transfer training, UE strengthening/ROM, Endurance training, Cognitive reorientation, Patient/family training, Equipment evaluation/education, Compensatory technique education  OT Frequency: 3 times per week  OT Discharge Recommendations: Moderate intensity level of continued care  Equipment Recommended upon Discharge:  (TBD)  OT Recommended Transfer Status: Assist of 1  OT - OK to Discharge: Yes (upon medical clearance)  Treatment Interventions: ADL retraining, Functional transfer training, UE strengthening/ROM, Endurance training, Cognitive reorientation, Patient/family training, Equipment evaluation/education, Compensatory technique education    Subjective   Current Problem:  1. Acute deep vein thrombosis (DVT) of femoral vein of both lower extremities (Multi)        2.  Atherosclerosis of autologous vein bypass graft of right lower extremity, with unspecified presence of clinical manifestation          General:   OT Received On: 12/13/24  General  Reason for Referral: right lower extremity pain, Acute deep vein thrombosis (DVT) of femoral vein of both lower extremities,  Past Medical History Relevant to Rehab: old left MCA stroke with residual Aphasia and Right Spastic hemiplasia, Previous Bilateral DVT and PE on Eliquis, Depression, BARAK  Family/Caregiver Present: No  Prior to Session Communication: Bedside nurse  Patient Position Received: Bed, 3 rail up, Alarm on  General Comment: Pt supine in bed upon arrival, agreeable to OT   Precautions:  Medical Precautions: Fall precautions    Pain:  Pain Assessment  Pain Assessment: 0-10  0-10 (Numeric) Pain Score:  (c/o pain in RLE with mobility)  Pain Type: Acute pain  Pain Location: Leg  Pain Orientation: Right  Pain Interventions: Repositioned, Distraction, Elevated    Objective   Cognition:  Orientation Level: Unable to assess  Cognition Comments: difficult to assess 2/2 aphasia, answering yes/no questions throughout session  Insight: Mild  Impulsive: Within functional limits     Home Living:  Type of Home: House  Lives With: Spouse (15 and 19 y.o. dtr)  Home Adaptive Equipment: Cane (R AFO (broken))  Home Layout: One level  Home Access: Stairs to enter with rails  Entrance Stairs-Number of Steps: 3  Bathroom Shower/Tub: Walk-in shower  Bathroom Equipment: Grab bars in shower, Shower chair without back  Home Living Comments: reports 1 recent fall  Prior Function:  Level of [place]: Independent with ADLs and functional transfers, Needs assistance with homemaking  ADL Assistance: Independent (reports A with ADLs)  Homemaking Assistance: Needs assistance (family A)  Ambulatory Assistance: Independent (mod A cane)  Vocational: Retired  Leisure: TV  Hand Dominance: Right  IADL History:  Homemaking Responsibilities: No  ADL:  Eating  Assistance: Stand by (anticipated)  Grooming Assistance: Stand by (anticipated)  Bathing Assistance: Moderate (anticipated seated)  UE Dressing Assistance: Stand by (SBA to adjust gown over back seated EOB)  LE Dressing Assistance: Maximal (donned B socks supine in bed max A)  Toileting Assistance with Device: Moderate (anticipated)    Activity Tolerance:  Endurance: Tolerates 10 - 20 min exercise with multiple rests    Bed Mobility/Transfers: Bed Mobility  Bed Mobility: Yes  Bed Mobility 1  Bed Mobility 1: Supine to sitting, Sitting to supine  Level of Assistance 1: Contact guard  Bed Mobility Comments 1: HOB elevated  Bed Mobility 2  Bed Mobility  2: Rolling right, Rolling left  Level of Assistance 2: Contact guard    Transfers  Transfer: Yes  Transfer 1  Transfer From 1: Sit to, Stand to  Transfer to 1: Stand, Sit  Technique 1: Sit to stand, Stand to sit  Transfer Device 1: Walker  Transfer Level of Assistance 1: Minimum assistance  Trials/Comments 1: walker use 2/2 cane not present and pt requesting walker, unable to take steps 2/2 increased pain, pt unable to put weight through RLE    Sitting Balance:  Static Sitting Balance  Static Sitting-Balance Support: Feet supported  Static Sitting-Level of Assistance: Distant supervision  Dynamic Sitting Balance  Dynamic Sitting-Balance Support: Feet supported  Dynamic Sitting-Level of Assistance: Distant supervision  Standing Balance:  Static Standing Balance  Static Standing-Balance Support: Left upper extremity supported  Static Standing-Level of Assistance: Minimum assistance  Dynamic Standing Balance  Dynamic Standing-Balance Support: Left upper extremity supported  Dynamic Standing-Level of Assistance: Minimum assistance    Modalities:  Modalities Used: No    Therapy/Activity:      Therapeutic Activity  Therapeutic Activity Performed: Yes  Therapeutic Activity 1: bed mob, transfers, extended time seated EOB, extended time for mobility 2/2 pain    Vision:Vision -  Basic Assessment  Current Vision:  (+ glasses)      Sensation:  Light Touch: No apparent deficits  Strength:  Strength Comments: RUE contracted, LUE WFL    Perception:  Inattention/Neglect: Appears intact  Coordination:  Movements are Fluid and Coordinated: No   Hand Function:  Hand Function  Gross Grasp: Impaired (RUE)  Coordination: Impaired (RUE)  Extremities: RUE   RUE : Exceptions to WFL and LUE   LUE: Within Functional Limits    Outcome Measures: [facility] Daily Activity  Putting on and taking off regular lower body clothing: A lot  Bathing (including washing, rinsing, drying): A lot  Putting on and taking off regular upper body clothing: A little  Toileting, which includes using toilet, bedpan or urinal: A lot  Taking care of personal grooming such as brushing teeth: A little  Eating Meals: A little  Daily Activity - Total Score: 15   and OT Adult Other Outcome Measures  4AT: unable to assess 2/2 aphasia    Education Documentation  Body Mechanics, taught by Álvaro Cornell OT at 12/13/2024  2:33 PM.  Learner: Patient  Readiness: Acceptance  Method: Explanation  Response: Demonstrated Understanding    Precautions, taught by Álvaro Cornell OT at 12/13/2024  2:33 PM.  Learner: Patient  Readiness: Acceptance  Method: Explanation  Response: Demonstrated Understanding    ADL Training, taught by Álvaro Cornell OT at 12/13/2024  2:33 PM.  Learner: Patient  Readiness: Acceptance  Method: Explanation  Response: Demonstrated Understanding    Education Comments  No comments found.      Goals:  Encounter Problems       Encounter Problems (Active)       ADLs       Patient with complete upper body dressing with set-up level of assistance donning and doffing all UE clothes with PRN adaptive equipment while edge of bed  (Progressing)       Start:  12/13/24    Expected End:  01/03/25            Patient with complete lower body dressing with minimal assist  level of assistance donning and doffing all LE clothes  with PRN adaptive  equipment while supported sitting and standing (Progressing)       Start:  12/13/24    Expected End:  01/03/25            Patient will complete toileting including hygiene clothing management/hygiene with contact guard assist level of assistance and bedside commode. (Progressing)       Start:  12/13/24    Expected End:  01/03/25               BALANCE       Pt will maintain dynamic standing balance during ADL task with modified independent level of assistance in order to demonstrate decreased risk of falling and improved postural control. (Progressing)       Start:  12/13/24    Expected End:  01/03/25               MOBILITY       Patient will perform Functional mobility min Household distances/Community Distances with modified independent level of assistance and least restrictive device in order to improve safety and functional mobility. (Progressing)       Start:  12/13/24    Expected End:  01/03/25               TRANSFERS       Patient will perform bed mobility modified independent level of assistance and bed rails in order to improve safety and independence with mobility (Progressing)       Start:  12/13/24    Expected End:  01/03/25            Patient will complete sit to stand transfer with modified independent level of assistance and least restrictive device in order to improve safety and prepare for out of bed mobility. (Progressing)       Start:  12/13/24    Expected End:  01/03/25               DAMIAN Bay/MAEVE

## 2024-12-13 NOTE — HOSPITAL COURSE
Loan Osborne is a 54 y.o. 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 Presenting to The ED with the concern of acute right lower extremity pain. Found to have an orthotic brace broken off and poking into her leg which was initially thought to be the source of her pain. DVT study revealed bilateral DVTs - R proximal femoral and L common femoral thrombus, a CT PE is neg. The patient was admitted to Medical team for DVT, and started on a Heparin drip.     In the ED:  Vital signs: afebrile, on RA sat > 97%, BP 140s/ 90s, HR 80s  Labs: unremarakble  Imaging: DVT study revealed bilateral DVTs - R proximal femoral and L common femoral thrombus, CT PE is neg.  EKG: normal Sinus rythem  ED Course:  S/p 1 mg Dilaudid IV, Toradol 15 mg IV, Zofran 4 mg IV x2, started on heparin gtt.     While on the floor:  While admitted it was realized through further history that patient was not in fact taking her eliquis at home. The Heparin drip was discontinued, and we restarted the Eliquis on 12/12. Her vital signs and lab work all remained within normal limits while on the floor.     On 12/13 we restarted her Baclofen at 10mg TID, this provided relief of her leg pain. The leg pain was deemed secondary to her known spastic hemiplegia for which she was previously prescribed Baclofen, but according to her and her dispense history she was not taking.     On 12/14 she was ready for discharge, with instructions to take her medications as prescribed. We ordered home health for her. Pending placement to SNF and was able to leave once accepted.     Follow up:   [ ] Discharge on [place] catheter and follow up with Urology in 1-2 weeks. If nobody from  reaches out, then please contact [phone]   [ ] Follow up with Dr. Palmer in Neurology on 2/24/2025

## 2024-12-13 NOTE — NURSING NOTE
12/12/2024 1902 Admission Update  Patient arrived to [place] [id number] from ED. Patient has aphasia and unable to answer any admission questions. RN attempted to contact family for information, including all 3 listed contact; no answer. Awaiting call back for further information regarding patient and care prior to admission. ---- Jesenia Springer RN

## 2024-12-13 NOTE — PROGRESS NOTES
Loan Osborne is a 54 y.o. female on day 1 of admission presenting with Acute deep vein thrombosis (DVT) of femoral vein of both lower extremities (Multi).    Transitional Care Coordination Progress Note:  Patient discussed during interdisciplinary rounds.   Team members present: MD & TCC  Plan per Medical/Surgical team: Patient admitted for DVT patient restarte on Eliquis  Payor: [employer] [employer]  Discharge disposition: TBD pt/ot eval  Potential Barriers: None  ADOD: 12/17    BHANU SingletonN-RN  Transitional Care Coordinator (TCC)  [phone] ext [phone]

## 2024-12-14 PROCEDURE — 99232 SBSQ HOSP IP/OBS MODERATE 35: CPT

## 2024-12-14 PROCEDURE — 2500000002 HC RX 250 W HCPCS SELF ADMINISTERED DRUGS (ALT 637 FOR MEDICARE OP, ALT 636 FOR OP/ED)

## 2024-12-14 PROCEDURE — 2500000005 HC RX 250 GENERAL PHARMACY W/O HCPCS

## 2024-12-14 PROCEDURE — 1100000001 HC PRIVATE ROOM DAILY

## 2024-12-14 PROCEDURE — 2500000001 HC RX 250 WO HCPCS SELF ADMINISTERED DRUGS (ALT 637 FOR MEDICARE OP)

## 2024-12-14 RX ORDER — BACLOFEN 10 MG/1
20 TABLET ORAL 3 TIMES DAILY
Qty: 180 TABLET | Refills: 0 | Status: SHIPPED | OUTPATIENT
Start: 2024-12-14 | End: 2024-12-14

## 2024-12-14 RX ORDER — TAMSULOSIN HYDROCHLORIDE 0.4 MG/1
0.4 CAPSULE ORAL NIGHTLY
Status: DISCONTINUED | OUTPATIENT
Start: 2024-12-14 | End: 2024-12-17 | Stop reason: HOSPADM

## 2024-12-14 RX ORDER — ATORVASTATIN CALCIUM 80 MG/1
80 TABLET, FILM COATED ORAL DAILY
Qty: 30 TABLET | Refills: 0 | Status: SHIPPED | OUTPATIENT
Start: 2024-12-14 | End: 2024-12-14

## 2024-12-14 RX ORDER — FERROUS SULFATE 325(65) MG
325 TABLET ORAL
Qty: 30 TABLET | Refills: 0 | Status: SHIPPED | OUTPATIENT
Start: 2024-12-14 | End: 2024-12-14

## 2024-12-14 RX ORDER — POLYETHYLENE GLYCOL 3350 17 G/17G
17 POWDER, FOR SOLUTION ORAL DAILY
Qty: 30 EACH | Refills: 0 | Status: SHIPPED | OUTPATIENT
Start: 2024-12-14

## 2024-12-14 RX ORDER — POLYETHYLENE GLYCOL 3350 17 G/17G
17 POWDER, FOR SOLUTION ORAL DAILY
Qty: 510 G | Refills: 0 | Status: SHIPPED | OUTPATIENT
Start: 2024-12-14 | End: 2024-12-14

## 2024-12-14 RX ORDER — FERROUS SULFATE 325(65) MG
325 TABLET ORAL
Qty: 30 TABLET | Refills: 0 | Status: SHIPPED | OUTPATIENT
Start: 2024-12-14 | End: 2025-01-13

## 2024-12-14 RX ORDER — ATORVASTATIN CALCIUM 80 MG/1
80 TABLET, FILM COATED ORAL DAILY
Qty: 30 TABLET | Refills: 0 | Status: SHIPPED | OUTPATIENT
Start: 2024-12-14 | End: 2025-01-13

## 2024-12-14 RX ORDER — BACLOFEN 10 MG/1
20 TABLET ORAL 3 TIMES DAILY
Qty: 180 TABLET | Refills: 0 | Status: SHIPPED | OUTPATIENT
Start: 2024-12-14 | End: 2025-01-13

## 2024-12-14 ASSESSMENT — COGNITIVE AND FUNCTIONAL STATUS - GENERAL
MOBILITY SCORE: 10
MOVING TO AND FROM BED TO CHAIR: A LOT
DRESSING REGULAR LOWER BODY CLOTHING: A LOT
MOVING FROM LYING ON BACK TO SITTING ON SIDE OF FLAT BED WITH BEDRAILS: A LITTLE
CLIMB 3 TO 5 STEPS WITH RAILING: TOTAL
TOILETING: A LOT
PERSONAL GROOMING: A LOT
EATING MEALS: A LITTLE
TURNING FROM BACK TO SIDE WHILE IN FLAT BAD: A LOT
STANDING UP FROM CHAIR USING ARMS: TOTAL
DRESSING REGULAR UPPER BODY CLOTHING: A LOT
DAILY ACTIVITIY SCORE: 13
WALKING IN HOSPITAL ROOM: TOTAL
HELP NEEDED FOR BATHING: A LOT

## 2024-12-14 ASSESSMENT — PAIN - FUNCTIONAL ASSESSMENT: PAIN_FUNCTIONAL_ASSESSMENT: 0-10

## 2024-12-14 ASSESSMENT — PAIN SCALES - GENERAL: PAINLEVEL_OUTOF10: 0 - NO PAIN

## 2024-12-14 NOTE — PROGRESS NOTES
Loan Osborne is a 54 y.o. female on day 2 of admission presenting with Acute deep vein thrombosis (DVT) of femoral vein of both lower extremities (Multi).      Subjective   No complaints this morning. Ready for SNF placement.        Objective     Last Recorded Vitals  /82   Pulse 76   Temp 36.4 °C (97.5 °F) (Temporal)   Resp 18   Wt 66.3 kg (146 lb 3.2 oz)   SpO2 99%   Intake/Output last 3 Shifts:    Intake/Output Summary (Last 24 hours) at 12/14/2024 1646  Last data filed at 12/14/2024 1344  Gross per 24 hour   Intake 720 ml   Output 2100 ml   Net -1380 ml       Admission Weight  Weight: 69.9 kg (154 lb) (12/11/24 2007)    Daily Weight  12/13/24 : 66.3 kg (146 lb 3.2 oz)    Image Results  XR ankle right 3+ views, XR foot right 1-2 views  Narrative: Interpreted By:  Maxwell Peres,   STUDY:  Right ankle and foot dated 12/12/2024.      INDICATION:  Signs/Symptoms:Ankle pain; Signs/Symptoms:Foot pain      COMPARISON:  Radiographs dated 12/11/2024.      ACCESSION NUMBER(S):  [id number]; [id number]      ORDERING CLINICIAN:  BERNARDO NAPIER      TECHNIQUE:  Three views radiographs of the right ankle and foot.      FINDINGS:  Foot radiographs are limited due to positioning. Bones are  demineralized. No fracture or dislocation is evident. The ankle  mortise is congruent and the tibial plafond and talar dome are  intact.    No ankle joint effusion is evident. No soft tissue gas or  radiopaque foreign body is evident.      Impression: No osseous injury is evident. If there remains concern for osseous  injury, CT is recommended.      MACRO:  None      Signed by: Maxwell Peres 12/12/2024 11:46 AM  Dictation workstation:   [id number]  CT angio chest for pulmonary embolism  Narrative: STUDY:  CT Angiogram of the Chest; 12/12/2024 at 2:34 AM.  INDICATION:  Bilateral lower extremity DVT, shortness of breath.  COMPARISON:  None available.  ACCESSION NUMBER(S):  [id number]  ORDERING CLINICIAN:  MARGARITA  COVER  TECHNIQUE:  CTA of the chest was performed with intravenous contrast.   Images are reviewed and processed at a workstation according to the CT  angiogram protocol with 3-D and/or MIP post processing imaging  generated.  Omnipaque 350 90 mL was administered intravenously.  Automated mA/kV exposure control was utilized and patient examination  was performed in strict accordance with principles of ALARA.  FINDINGS:  Pulmonary arteries are adequately opacified without acute or chronic  filling defects.  The thoracic aorta is normal in course and caliber  without dissection or aneurysm.  The heart is normal in size without pericardial effusion.  Thoracic  lymph nodes are not enlarged.  There is no pleural effusion, pleural thickening, or pneumothorax.   The airways are patent.  Lungs are clear without consolidation, interstitial disease, or  suspicious nodules.  Upper abdomen demonstrates no acute pathology.  There are no acute fractures.  No suspicious bony lesions.  Impression: No evidence of pulmonary embolism.  Signed by Quinten Santana MD  Lower extremity venous duplex right  Narrative: Interpreted By:  Bruno Lr and Benza Andrew   STUDY:  [facility] US LOWER EXTREMITY VENOUS DUPLEX RIGHT;  12/11/2024 10:58 pm      INDICATION:  Signs/Symptoms:Immobilization from stroke, swelling.          COMPARISON:  Duplex lower extremity ultrasound dated 08/11/2020      ACCESSION NUMBER(S):  [id number]      ORDERING CLINICIAN:  AGUSTIN VARGAS      TECHNIQUE:  Vascular ultrasound of the bilateral lower extremities was performed.  Real-time compression views as well as Gray scale, color Doppler and  spectral Doppler waveform analysis was performed.      FINDINGS:  Evaluation of the visualized portions of the bilateral common femoral  vein, proximal, mid, and distal femoral vein, and popliteal vein were  performed/attempted.      Limitations:  Examination is limited by lack of patient mobility and  small vessels.      The  right proximal femoral vein and the left common femoral vein  demonstrate partial compressibility with echogenic material seen  within the lumen. The right common femoral vein and right posterior  tibial vein are visualized and are compressible. The remainder of the  lower extremity veins were not visualized due to the limitations of  the study.          Impression: Examination is limited by lack of patient mobility and small vessels.  Within these limitations:      Partial compressibility of the right proximal femoral vein and left  common femoral vein, most compatible with thrombus.      I personally reviewed the images/study and I agree with the findings  as stated above by resident physician, Jose J Ferguson MD. This study  was interpreted at [place], [place].      MACRO:  Critical Finding:  Thrombosis. Notification was initiated on  12/11/2024 at 11:37 pm by  Jose J Ferguson.  (**-OCF-**) Instructions:      Signed by: Bruno Lr 12/12/2024 12:01 AM  Dictation workstation:   [id number]      Physical Exam    Gen: well appearing, A+Ox3, in no acute distress  HEENT: sclera anicteric, no conjunctival injection, MMM  Resp: CTAB, normal breath sounds, no wheezing/crackles  CV: RRR, normal S1/S2  GI: Suprapubic tenderness/ Fullness, non-distended, no rebound or guarding  Extremities/MSK: warm and well perfused, no edema bilateral, Right thigh pain  Spastic, contracted RUE  Neuro: A+Ox3, no focal deficits, no asterixis  Skin: warm and dry, no lesions, no rashes     This patient has a urinary catheter   Reason for the urinary catheter remaining today? critically ill patient who need accurate urinary output measurements          Assessment & Plan  Acute deep vein thrombosis (DVT) of femoral vein of both lower extremities (Multi)    Loan Osborne is a 54 y.o. 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 Presenting to The ED with the concern of acute right lower extremity pain. Found to have an orthotic brace broken off and poking into her leg which was initially thought to be the source of her pain. DVT study revealed bilateral DVTs - R proximal femoral and L common femoral thrombus, a CT PE is neg. Back on Eliquis.      Updates 12/14:  - Family wanting SNF placement  - Working with social work, plan/hopeful for [facility]      # Acute Bilateral DVT while on Apixaban  # Failure of Apixaban Therapy  # DVT/PE (2017, 2019)   :: LLE DVT in 2019 was attributed to Eliquis, charted as non compliance? Unsure if the patient remained on AC after the first event or it was discontinued , CT venogram during that admission  with evidence of chronic appearance left common iliac and external iliac thrombus   :: June 2017 she had a deep vein thrombosis and pulmonary embolus. She was placed on Xarelto at that time; a prior note states it was stopped in September 2017 after a CT scan.   :: US Doppler Temo LE: Examination is limited by lack of patient mobility and small vessels.  Within these limitations: Partial compressibility of the right proximal femoral vein and left  common femoral vein, most compatible with thrombu.  :: CTPE neg     - Back on Eliquis  - XR negative for fractures of R foot  - Calling family for more collateral on eliquis and leg pain     #\"Cryptogenic\" Stroke 9/2016  :: L MCA, L M1 occlusion, residual severe aphasia and spastic Right hemiparesis.   :: Anticardiolipin antibody, antinuclear antibody, beta-2 glycoprotein DRVVT - all normal.   Workup was not revealing for etiology of her stroke     - Continue Atorvastatin, continue Baclofen from home pending urine output  - Continue Home PT  - as AFO is broken, plan to order a new one before discharge     # Acute urine retention, Dysuria  # Hx of fibroids with previous episodes of menorrhagia   No surgical intervention done for fibroids- per " chart review the patient was not agreeable to it.   Likely iso of acute pain and opioids, UA to rule out infection. Known fibroid can be contributing,     - follow up bladder scan   - UA pending  - Consider a US abdomen/ Gynecological US for evaluation     #High blood pressure reading  :: Not known to have hypertension  :: SBP in the 150s , iso of pain and discomfort.      - CTM, consider starting an antihypertensive medication of BP continues to be high during the admission      # Elevated Liver enzymes  :: ALT 55, AST 47 not previously noted to be elevated. last checked 3/2024  ::  ( noted to be elevated within the same range since 2021)  :: , TG 73, Chol 230 3/2024, HbA1c 5.1  :: no abdominal pain, No vomiting, some nausea associated with IV morphine     - Consider liver US if increase in AM  - hepatitis serology negative   - repeat LDL, HbA1c      # Depression   sertraline (Zoloft) 100 mg         N: regular diet  A: PIV  O2: RA  DVT ppx: Heparin gtt  GI ppx: Not indicated  Code Status: Full code  Contact:  GURPREETURIAHOBI (Daughter)  [phone] (Mobile)        Sampson Green MD

## 2024-12-14 NOTE — PROGRESS NOTES
Per medical team, pt medically ready for discharge. Per team, discussed dispo plans with pt's dtrHugo and dtr would prefer pt to DC home with HC. TCC attempted to speak with pt's dtr, regarding dispo plan but there was no answer. Left a VM message with TCC info. Will make medical team aware.   1303-Per medical team pt's spouse now wants SNF. Still pending PT eval but TCC will give SNF list to spouse.   1519-Spouse not in room. Left SNF list at bedside.  1644-TCC attempted to update pt's dtr, Hugo to make aware SNF list at bedside but there was no answer. Left a VM message with TCC info.

## 2024-12-14 NOTE — DISCHARGE INSTRUCTIONS
Ms. Osborne,    You were admitted for right leg pain. During your hospital stay we obtained X-rays, CT scans, and ultrasounds of your legs, which demonstrated blood clots in both legs. Ultimately it was deemed the cause of the blood clots was under-administration of the Eliquis at home. The leg pain is most likely the known spastic hemiplegia form the prior stroke, and for this she needs to be on a muscle relaxant at home. For this we restarted her on her Baclofen at 10mg three times per day.     It is critical that she takes the Eliquis correctly. The eliquis works to prevent blood clot formation. If she does not take it as prescribed, she is at risk of forming blood clots in her legs, which can ultimately travel up to her lung and cause a pulmonary embolism, or blood clot in the lung. This is a very serious condition that is often fatal.     Your  Care Team    To Do:  - Please bring your discharge paperwork to your next Primary Care Appointment   - Continue taking Eliquis 5 mg     Medication Changes:  - We decreased your Baclofen, a muscle relaxer, to 10mg three times per day.   - START taking tamsulosin 0.4 mg daily   Appointments:  [ ] Discharge on [place] catheter and follow up with Urology in 1-2 weeks. If nobody from  reaches out, then please contact [phone]   [ ] Follow up with Dr. Palmer in Neurology on 2/24/2025

## 2024-12-14 NOTE — CARE PLAN
The patient's goals for the shift include      The clinical goals for the shift include pt will urinate post jean removal by end of shift      Problem: Pain - Adult  Goal: Verbalizes/displays adequate comfort level or baseline comfort level  Outcome: Progressing     Problem: Safety - Adult  Goal: Free from fall injury  Outcome: Progressing     Problem: Discharge Planning  Goal: Discharge to home or other facility with appropriate resources  Outcome: Progressing     Problem: Chronic Conditions and Co-morbidities  Goal: Patient's chronic conditions and co-morbidity symptoms are monitored and maintained or improved  Outcome: Progressing     Problem: Skin  Goal: Decreased wound size/increased tissue granulation at next dressing change  Outcome: Progressing  Goal: Participates in plan/prevention/treatment measures  Outcome: Progressing  Goal: Prevent/manage excess moisture  Outcome: Progressing  Goal: Prevent/minimize sheer/friction injuries  Outcome: Progressing  Goal: Promote/optimize nutrition  Outcome: Progressing  Goal: Promote skin healing  Outcome: Progressing     Problem: Pain  Goal: Takes deep breaths with improved pain control throughout the shift  Outcome: Progressing  Goal: Turns in bed with improved pain control throughout the shift  Outcome: Progressing  Goal: Walks with improved pain control throughout the shift  Outcome: Progressing  Goal: Performs ADL's with improved pain control throughout shift  Outcome: Progressing  Goal: Participates in PT with improved pain control throughout the shift  Outcome: Progressing  Goal: Free from opioid side effects throughout the shift  Outcome: Progressing  Goal: Free from acute confusion related to pain meds throughout the shift  Outcome: Progressing

## 2024-12-15 VITALS
BODY MASS INDEX: 23.5 KG/M2 | HEART RATE: 91 BPM | HEIGHT: 66 IN | SYSTOLIC BLOOD PRESSURE: 139 MMHG | WEIGHT: 146.2 LBS | DIASTOLIC BLOOD PRESSURE: 96 MMHG | OXYGEN SATURATION: 96 % | RESPIRATION RATE: 17 BRPM | TEMPERATURE: 97.7 F

## 2024-12-15 LAB
ALBUMIN SERPL BCP-MCNC: 4.1 G/DL (ref 3.4–5)
ANION GAP SERPL CALC-SCNC: 15 MMOL/L (ref 10–20)
BASOPHILS # BLD AUTO: 0.03 X10*3/UL (ref 0–0.1)
BASOPHILS NFR BLD AUTO: 0.5 %
BUN SERPL-MCNC: 5 MG/DL (ref 6–23)
CALCIUM SERPL-MCNC: 9.9 MG/DL (ref 8.6–10.6)
CHLORIDE SERPL-SCNC: 103 MMOL/L (ref 98–107)
CO2 SERPL-SCNC: 26 MMOL/L (ref 21–32)
CREAT SERPL-MCNC: 0.56 MG/DL (ref 0.5–1.05)
EGFRCR SERPLBLD CKD-EPI 2021: >90 ML/MIN/1.73M*2
EOSINOPHIL # BLD AUTO: 0.06 X10*3/UL (ref 0–0.7)
EOSINOPHIL NFR BLD AUTO: 0.9 %
ERYTHROCYTE [DISTWIDTH] IN BLOOD BY AUTOMATED COUNT: 13.8 % (ref 11.5–14.5)
GLUCOSE SERPL-MCNC: 119 MG/DL (ref 74–99)
HCT VFR BLD AUTO: 42.1 % (ref 36–46)
HGB BLD-MCNC: 14.9 G/DL (ref 12–16)
IMM GRANULOCYTES # BLD AUTO: 0.02 X10*3/UL (ref 0–0.7)
IMM GRANULOCYTES NFR BLD AUTO: 0.3 % (ref 0–0.9)
LYMPHOCYTES # BLD AUTO: 1.41 X10*3/UL (ref 1.2–4.8)
LYMPHOCYTES NFR BLD AUTO: 21.4 %
MAGNESIUM SERPL-MCNC: 1.95 MG/DL (ref 1.6–2.4)
MCH RBC QN AUTO: 32 PG (ref 26–34)
MCHC RBC AUTO-ENTMCNC: 35.4 G/DL (ref 32–36)
MCV RBC AUTO: 90 FL (ref 80–100)
MONOCYTES # BLD AUTO: 0.63 X10*3/UL (ref 0.1–1)
MONOCYTES NFR BLD AUTO: 9.5 %
NEUTROPHILS # BLD AUTO: 4.45 X10*3/UL (ref 1.2–7.7)
NEUTROPHILS NFR BLD AUTO: 67.4 %
NRBC BLD-RTO: 0 /100 WBCS (ref 0–0)
PHOSPHATE SERPL-MCNC: 3.6 MG/DL (ref 2.5–4.9)
PLATELET # BLD AUTO: 218 X10*3/UL (ref 150–450)
POTASSIUM SERPL-SCNC: 3.9 MMOL/L (ref 3.5–5.3)
RBC # BLD AUTO: 4.66 X10*6/UL (ref 4–5.2)
SODIUM SERPL-SCNC: 140 MMOL/L (ref 136–145)
WBC # BLD AUTO: 6.6 X10*3/UL (ref 4.4–11.3)

## 2024-12-15 PROCEDURE — 2500000002 HC RX 250 W HCPCS SELF ADMINISTERED DRUGS (ALT 637 FOR MEDICARE OP, ALT 636 FOR OP/ED)

## 2024-12-15 PROCEDURE — 2500000001 HC RX 250 WO HCPCS SELF ADMINISTERED DRUGS (ALT 637 FOR MEDICARE OP)

## 2024-12-15 PROCEDURE — 97161 PT EVAL LOW COMPLEX 20 MIN: CPT | Mod: GP

## 2024-12-15 PROCEDURE — 36415 COLL VENOUS BLD VENIPUNCTURE: CPT

## 2024-12-15 PROCEDURE — 99231 SBSQ HOSP IP/OBS SF/LOW 25: CPT

## 2024-12-15 PROCEDURE — 80069 RENAL FUNCTION PANEL: CPT

## 2024-12-15 PROCEDURE — 1100000001 HC PRIVATE ROOM DAILY

## 2024-12-15 PROCEDURE — 83735 ASSAY OF MAGNESIUM: CPT

## 2024-12-15 PROCEDURE — 2500000005 HC RX 250 GENERAL PHARMACY W/O HCPCS

## 2024-12-15 PROCEDURE — 97530 THERAPEUTIC ACTIVITIES: CPT | Mod: GP

## 2024-12-15 PROCEDURE — 85025 COMPLETE CBC W/AUTO DIFF WBC: CPT

## 2024-12-15 ASSESSMENT — COGNITIVE AND FUNCTIONAL STATUS - GENERAL
TOILETING: A LOT
DRESSING REGULAR UPPER BODY CLOTHING: A LITTLE
CLIMB 3 TO 5 STEPS WITH RAILING: TOTAL
CLIMB 3 TO 5 STEPS WITH RAILING: TOTAL
EATING MEALS: A LITTLE
PERSONAL GROOMING: A LITTLE
STANDING UP FROM CHAIR USING ARMS: A LOT
WALKING IN HOSPITAL ROOM: TOTAL
MOVING FROM LYING ON BACK TO SITTING ON SIDE OF FLAT BED WITH BEDRAILS: A LITTLE
MOVING TO AND FROM BED TO CHAIR: A LOT
MOBILITY SCORE: 12
DAILY ACTIVITIY SCORE: 16
HELP NEEDED FOR BATHING: A LITTLE
DRESSING REGULAR LOWER BODY CLOTHING: A LOT
TURNING FROM BACK TO SIDE WHILE IN FLAT BAD: A LITTLE
MOBILITY SCORE: 10
MOVING TO AND FROM BED TO CHAIR: TOTAL
WALKING IN HOSPITAL ROOM: TOTAL
TURNING FROM BACK TO SIDE WHILE IN FLAT BAD: A LITTLE
MOVING FROM LYING ON BACK TO SITTING ON SIDE OF FLAT BED WITH BEDRAILS: A LITTLE
STANDING UP FROM CHAIR USING ARMS: TOTAL

## 2024-12-15 ASSESSMENT — PAIN SCALES - GENERAL: PAINLEVEL_OUTOF10: 0 - NO PAIN

## 2024-12-15 ASSESSMENT — PAIN DESCRIPTION - LOCATION: LOCATION: GENERALIZED

## 2024-12-15 ASSESSMENT — PAIN - FUNCTIONAL ASSESSMENT
PAIN_FUNCTIONAL_ASSESSMENT: UNABLE TO SELF-REPORT
PAIN_FUNCTIONAL_ASSESSMENT: 0-10

## 2024-12-15 NOTE — PROGRESS NOTES
Loan Osborne is a 54 y.o. female on day 3 of admission presenting with Acute deep vein thrombosis (DVT) of femoral vein of both lower extremities (Multi).      Subjective     Resting in bed comfortably without complaint. No pain anywhere. Waiting for placement.     Objective     Last Recorded Vitals  /83 (BP Location: Left arm, Patient Position: Lying)   Pulse 88   Temp 36.5 °C (97.7 °F) (Temporal)   Resp 18   Wt 66.3 kg (146 lb 3.2 oz)   SpO2 94%   Intake/Output last 3 Shifts:    Intake/Output Summary (Last 24 hours) at 12/15/2024 1358  Last data filed at 12/15/2024 0500  Gross per 24 hour   Intake --   Output 1350 ml   Net -1350 ml       Admission Weight  Weight: 69.9 kg (154 lb) (12/11/24 2007)    Daily Weight  12/13/24 : 66.3 kg (146 lb 3.2 oz)    Image Results  XR ankle right 3+ views, XR foot right 1-2 views  Narrative: Interpreted By:  Maxwell Peres,   STUDY:  Right ankle and foot dated 12/12/2024.      INDICATION:  Signs/Symptoms:Ankle pain; Signs/Symptoms:Foot pain      COMPARISON:  Radiographs dated 12/11/2024.      ACCESSION NUMBER(S):  [id number]; [id number]      ORDERING CLINICIAN:  BERNARDO NAPIER      TECHNIQUE:  Three views radiographs of the right ankle and foot.      FINDINGS:  Foot radiographs are limited due to positioning. Bones are  demineralized. No fracture or dislocation is evident. The ankle  mortise is congruent and the tibial plafond and talar dome are  intact.    No ankle joint effusion is evident. No soft tissue gas or  radiopaque foreign body is evident.      Impression: No osseous injury is evident. If there remains concern for osseous  injury, CT is recommended.      MACRO:  None      Signed by: Maxwell Peres 12/12/2024 11:46 AM  Dictation workstation:   [id number]  CT angio chest for pulmonary embolism  Narrative: STUDY:  CT Angiogram of the Chest; 12/12/2024 at 2:34 AM.  INDICATION:  Bilateral lower extremity DVT, shortness of breath.  COMPARISON:  None  available.  ACCESSION NUMBER(S):  [id number]  ORDERING CLINICIAN:  MARGARITA MCPHERSON  TECHNIQUE:  CTA of the chest was performed with intravenous contrast.   Images are reviewed and processed at a workstation according to the CT  angiogram protocol with 3-D and/or MIP post processing imaging  generated.  Omnipaque 350 90 mL was administered intravenously.  Automated mA/kV exposure control was utilized and patient examination  was performed in strict accordance with principles of ALARA.  FINDINGS:  Pulmonary arteries are adequately opacified without acute or chronic  filling defects.  The thoracic aorta is normal in course and caliber  without dissection or aneurysm.  The heart is normal in size without pericardial effusion.  Thoracic  lymph nodes are not enlarged.  There is no pleural effusion, pleural thickening, or pneumothorax.   The airways are patent.  Lungs are clear without consolidation, interstitial disease, or  suspicious nodules.  Upper abdomen demonstrates no acute pathology.  There are no acute fractures.  No suspicious bony lesions.  Impression: No evidence of pulmonary embolism.  Signed by Quinten Santana MD  Lower extremity venous duplex right  Narrative: Interpreted By:  Bruno Lr and Benza Andrew   STUDY:  [facility] US LOWER EXTREMITY VENOUS DUPLEX RIGHT;  12/11/2024 10:58 pm      INDICATION:  Signs/Symptoms:Immobilization from stroke, swelling.          COMPARISON:  Duplex lower extremity ultrasound dated 08/11/2020      ACCESSION NUMBER(S):  [id number]      ORDERING CLINICIAN:  AGUSTIN VARGAS      TECHNIQUE:  Vascular ultrasound of the bilateral lower extremities was performed.  Real-time compression views as well as Gray scale, color Doppler and  spectral Doppler waveform analysis was performed.      FINDINGS:  Evaluation of the visualized portions of the bilateral common femoral  vein, proximal, mid, and distal femoral vein, and popliteal vein were  performed/attempted.      Limitations:   Examination is limited by lack of patient mobility and  small vessels.      The right proximal femoral vein and the left common femoral vein  demonstrate partial compressibility with echogenic material seen  within the lumen. The right common femoral vein and right posterior  tibial vein are visualized and are compressible. The remainder of the  lower extremity veins were not visualized due to the limitations of  the study.          Impression: Examination is limited by lack of patient mobility and small vessels.  Within these limitations:      Partial compressibility of the right proximal femoral vein and left  common femoral vein, most compatible with thrombus.      I personally reviewed the images/study and I agree with the findings  as stated above by resident physician, Jose J Ferguson MD. This study  was interpreted at [facility],  [place], [place].      MACRO:  Critical Finding:  Thrombosis. Notification was initiated on  12/11/2024 at 11:37 pm by  Jose J Ferguson.  (**-OCF-**) Instructions:      Signed by: Bruno Lr 12/12/2024 12:01 AM  Dictation workstation:   [id number]      Physical Exam    Gen: well appearing, A+Ox3, in no acute distress  HEENT: sclera anicteric, no conjunctival injection, MMM  Resp: CTAB, normal breath sounds, no wheezing/crackles  CV: RRR, normal S1/S2  GI: Suprapubic tenderness/ Fullness, non-distended, no rebound or guarding  Extremities/MSK: warm and well perfused, no edema bilateral, Right thigh pain  Spastic, contracted RUE  Neuro: A+Ox3, no focal deficits, no asterixis  Skin: warm and dry, no lesions, no rashes       Assessment & Plan  Acute deep vein thrombosis (DVT) of femoral vein of both lower extremities (Multi)    Loan Osborne is a 54 y.o. 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 Presenting to The ED with the concern of acute right lower  "extremity pain. Found to have an orthotic brace broken off and poking into her leg which was initially thought to be the source of her pain. DVT study revealed bilateral DVTs - R proximal femoral and L common femoral thrombus, a CT PE is neg. Back on Eliquis.      Updates 12/15:  - Family wanting SNF placement  - Working with social work, plan/hopeful for [facility]      # Acute Bilateral DVT while on Apixaban  # Failure of Apixaban Therapy  # DVT/PE (2017, 2019)   :: LLE DVT in 2019 was attributed to Eliquis, charted as non compliance? Unsure if the patient remained on AC after the first event or it was discontinued , CT venogram during that admission  with evidence of chronic appearance left common iliac and external iliac thrombus   :: June 2017 she had a deep vein thrombosis and pulmonary embolus. She was placed on Xarelto at that time; a prior note states it was stopped in September 2017 after a CT scan.   :: US Doppler Temo LE: Examination is limited by lack of patient mobility and small vessels.  Within these limitations: Partial compressibility of the right proximal femoral vein and left  common femoral vein, most compatible with thrombu.  :: CTPE neg   PLAN:  - Back on Eliquis  - XR negative for fractures of R foot  - Calling family for more collateral on eliquis and leg pain     #\"Cryptogenic\" Stroke 9/2016  :: L MCA, L M1 occlusion, residual severe aphasia and spastic Right hemiparesis.   :: Anticardiolipin antibody, antinuclear antibody, beta-2 glycoprotein DRVVT - all normal.   Workup was not revealing for etiology of her stroke   PLAN:  - Continue Atorvastatin, continue Baclofen from home pending urine output  - Continue Home PT  - as AFO is broken, plan to order a new one before discharge     # Acute urine retention, Dysuria  # Hx of fibroids with previous episodes of menorrhagia   No surgical intervention done for fibroids- per chart review the patient was not agreeable to it.   Likely iso of acute pain " and opioids, UA to rule out infection. Known fibroid can be contributing,   PLAN:  - follow up bladder scan   - UA pending  - Consider a US abdomen/ Gynecological US for evaluation     #High blood pressure reading  :: Not known to have hypertension  :: SBP in the 150s , iso of pain and discomfort.    PLAN:  - CTM, consider starting an antihypertensive medication of BP continues to be high during the admission      # Elevated Liver enzymes  :: ALT 55, AST 47 not previously noted to be elevated. last checked 3/2024  ::  ( noted to be elevated within the same range since 2021)  :: , TG 73, Chol 230 3/2024, HbA1c 5.1  :: no abdominal pain, No vomiting, some nausea associated with IV morphine   PLAN:  - Consider liver US if increase in AM  - hepatitis serology negative   - repeat LDL, HbA1c      # Depression   sertraline (Zoloft) 100 mg         N: regular diet  A: PIV  O2: RA  DVT ppx: Heparin gtt  GI ppx: Not indicated  Code Status: Full code  Contact:  MARGRET VÁZQUEZ (Daughter)  [phone] (Mobile)     Sampson Green MD  Internal Medicine, [facility], [place]

## 2024-12-15 NOTE — PROGRESS NOTES
Physical Therapy    Physical Therapy Evaluation & Treatment    Patient Name: Loan Osborne  MRN: [id number]  Department: [place] [address]  Room: [facility]-  Today's Date: 12/15/2024   Time Calculation  Start Time: 1505  Stop Time: 1533  Time Calculation (min): 28 min    Assessment/Plan   PT Assessment  PT Assessment Results: Decreased strength, Decreased range of motion, Decreased endurance, Impaired balance, Decreased mobility, Decreased coordination, Decreased cognition, Impaired judgement, Pain  Rehab Prognosis: Good  Barriers to Discharge Home: Physical needs  Physical Needs: Stair navigation into home limited by function/safety, Ambulating household distances limited by function/safety  Evaluation/Treatment Tolerance: Patient limited by pain  Medical Staff Made Aware: Yes  Strengths: Rehab experience, Support of Caregivers  Barriers to Participation: Comorbidities  End of Session Communication: Bedside nurse (Pain levels communicated)  Assessment Comment: 54 y.o. female with history of L MCA stroke presents with bilateral DVTs. Pt able to sit EOB this session for limited duration 2/2 intense increase in pain levels in RLE. Pt is limited by pain, R sided weakness, and decreased balance. Pt would benefit from Moderate Intensity Rehab after DC to address these deficits.  End of Session Patient Position: Bed, 3 rail up, Alarm off, not on at start of session   IP OR SWING BED PT PLAN  Inpatient or Swing Bed: Inpatient  PT Plan  Treatment/Interventions: Bed mobility, Transfer training, Gait training, Stair training, Balance training, Neuromuscular re-education, Strengthening, Endurance training, Range of motion, Therapeutic exercise, Therapeutic activity  PT Plan: Ongoing PT  PT Frequency: 3 times per week  PT Discharge Recommendations: Moderate intensity level of continued care  Equipment Recommended upon Discharge:  (TBD)  PT Recommended Transfer Status: Assist x1, Assistive device  PT - OK to Discharge:  "Yes      Subjective     General Visit Information:  General  Reason for Referral: Right lower extremity pain, Acute deep vein thrombosis (DVT) of femoral vein of both lower extremities  Past Medical History Relevant to Rehab: 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 daughters)  Home Adaptive Equipment: Cane (R AFO (broken))  Home Layout: One level  Home Access: Stairs to enter with rails  Entrance Stairs-Number of Steps: 3  Bathroom Shower/Tub: Walk-in shower  Bathroom Equipment: Grab bars in shower, Shower chair without back  Home Living Comments: 1 fall in past 3 months  Prior Level of Function:  Prior Function Per Pt/Caregiver Report  Level of [place]: Independent with ADLs and functional transfers, Needs assistance with homemaking  Ambulatory Assistance: Independent (With cane)  Vocational: Retired  Precautions:  Precautions  Medical Precautions: Fall precautions  Braces Applied: AFO present in room but broken    Objective   Pain:  Pain Assessment  Pain Assessment: Unable to self-report (Pt unable to report pain. Upon entry to room pt stating \"no\" in response to question about pain. However, as pt sat EOB she began yelling 2/2 pain in RLE)  Cognition:  Cognition  Orientation Level: Unable to assess  Cognition Comments: Pt able to answer yes/no questions and point to communicate throughout session  Insight: Mild  Impulsive: Within functional limits    General Assessments:    Activity Tolerance  Endurance: Decreased tolerance for upright activites  Early " Mobility/Exercise Safety Screen: Proceed with mobilization - No exclusion criteria met    Sensation  Light Touch: No apparent deficits    Coordination  Movements are Fluid and Coordinated: No (R LE and UE spastic)    Postural Control  Postural Control: Within Functional Limits    Static Sitting Balance  Static Sitting-Balance Support: Feet supported, No upper extremity supported  Static Sitting-Level of Assistance: Close supervision  Static Sitting-Comment/Number of Minutes: 60s (Needing to return to supine 2/2 pain)       Functional Assessments:       Bed Mobility  Bed Mobility: Yes  Bed Mobility 1  Bed Mobility 1: Supine to sitting  Level of Assistance 1: Contact guard  Bed Mobility Comments 1: HOB elevated  Bed Mobility 2  Bed Mobility  2: Sitting to supine  Level of Assistance 2: Minimum assistance, Minimal verbal cues  Bed Mobility Comments 2: Assist at RLE  Bed Mobility 3  Bed Mobility 3: Scooting  Level of Assistance 3: Dependent, +2  Bed Mobility Comments 3: Boost    Transfers  Transfer: No (Pt unable to tolerate RLE pain)    Ambulation/Gait Training  Ambulation/Gait Training Performed: No    Extremity/Trunk Assessments:     RLE   RLE : Exceptions to WFL  Strength RLE  RLE Overall Strength: Due to pain, Deficits (<3/5)  LLE   LLE : Within Functional Limits  Treatments:    Increased time spent performing all mobility 2/2 high pain levels. Increased time spent educating pt on breathing techniques 2/2 rapid, uncontrolled breathing with increased pain levels.     Outcome Measures:  [facility] Basic Mobility  Turning from your back to your side while in a flat bed without using bedrails: A little  Moving from lying on your back to sitting on the side of a flat bed without using bedrails: A little  Moving to and from bed to chair (including a wheelchair): Total  Standing up from a chair using your arms (e.g. wheelchair or bedside chair): Total  To walk in hospital room: Total  Climbing 3-5 steps with railing:  Total  Basic Mobility - Total Score: 10    Encounter Problems       Encounter Problems (Active)       Balance       STG - Maintains dynamic standing balance without upper extremity support for 60s with SBA (Progressing)       Start:  12/15/24    Expected End:  12/29/24       INTERVENTIONS:  1. Practice standing with minimal support.  2. Educate patient about standing tolerance.  3. Educate patient about independence with gait, transfers, and ADL's.  4. Educate patient about use of assistive device.  5. Educate patient about self-directed care.            Mobility       LTG - Patient will ambulate community distance indep with LRD (Progressing)       Start:  12/15/24    Expected End:  12/29/24            LTG - Patient will navigate 3 steps with CGA and rails/device (Progressing)       Start:  12/15/24    Expected End:  12/29/24               PT Transfers       STG - Patient will perform bed mobility independently (Progressing)       Start:  12/15/24    Expected End:  12/29/24            STG - Patient will transfer sit to and from stand independently with LRD (Progressing)       Start:  12/15/24    Expected End:  12/29/24               Pain - Adult              Education Documentation  Precautions, taught by Jessika Ortega PT at 12/15/2024  4:04 PM.  Learner: Patient  Readiness: Acceptance  Method: Explanation, Demonstration  Response: Verbalizes Understanding, Demonstrated Understanding    Body Mechanics, taught by Jessika Ortega PT at 12/15/2024  4:04 PM.  Learner: Patient  Readiness: Acceptance  Method: Explanation, Demonstration  Response: Verbalizes Understanding, Demonstrated Understanding    Mobility Training, taught by Jessika Ortega PT at 12/15/2024  4:04 PM.  Learner: Patient  Readiness: Acceptance  Method: Explanation, Demonstration  Response: Verbalizes Understanding, Demonstrated Understanding    Education Comments  No comments found.

## 2024-12-15 NOTE — CARE PLAN
The patient's goals for the shift include  adequate comfort maintained this shift    The clinical goals for the shift include skin integrity maintained

## 2024-12-16 LAB
ALBUMIN SERPL BCP-MCNC: 4.4 G/DL (ref 3.4–5)
ANION GAP SERPL CALC-SCNC: 18 MMOL/L (ref 10–20)
BUN SERPL-MCNC: 6 MG/DL (ref 6–23)
CALCIUM SERPL-MCNC: 10.3 MG/DL (ref 8.6–10.6)
CHLORIDE SERPL-SCNC: 101 MMOL/L (ref 98–107)
CO2 SERPL-SCNC: 27 MMOL/L (ref 21–32)
CREAT SERPL-MCNC: 0.66 MG/DL (ref 0.5–1.05)
DRVVT SCREEN TO CONFIRM RATIO: 1.39 RATIO
DRVVT/DRVVT CFM NRMLZD PPP-RTO: 2.05 RATIO
DRVVT/DRVVT CFM P DOAC NEUT NORM PPP-RTO: 0.68 RATIO
EGFRCR SERPLBLD CKD-EPI 2021: >90 ML/MIN/1.73M*2
ELECTRONICALLY SIGNED BY: NORMAL
FACTOR V LEIDEN INTERPRETATION: NORMAL
FACTOR V LEIDEN RESULT: NORMAL
GLUCOSE SERPL-MCNC: 184 MG/DL (ref 74–99)
LA 2 SCREEN W REFLEX-IMP: NORMAL
NORMALIZED SCT PPP-RTO: 0.81 RATIO
PHOSPHATE SERPL-MCNC: 3.4 MG/DL (ref 2.5–4.9)
POTASSIUM SERPL-SCNC: 3.5 MMOL/L (ref 3.5–5.3)
SILICA CLOTTING TIME CONFIRMATION: 0.99 RATIO
SILICA CLOTTING TIME SCREEN: 0.8 RATIO
SODIUM SERPL-SCNC: 142 MMOL/L (ref 136–145)

## 2024-12-16 PROCEDURE — 99231 SBSQ HOSP IP/OBS SF/LOW 25: CPT

## 2024-12-16 PROCEDURE — 1100000001 HC PRIVATE ROOM DAILY

## 2024-12-16 PROCEDURE — 2500000005 HC RX 250 GENERAL PHARMACY W/O HCPCS

## 2024-12-16 PROCEDURE — 80069 RENAL FUNCTION PANEL: CPT

## 2024-12-16 PROCEDURE — 2500000001 HC RX 250 WO HCPCS SELF ADMINISTERED DRUGS (ALT 637 FOR MEDICARE OP)

## 2024-12-16 PROCEDURE — 36415 COLL VENOUS BLD VENIPUNCTURE: CPT

## 2024-12-16 PROCEDURE — 2500000002 HC RX 250 W HCPCS SELF ADMINISTERED DRUGS (ALT 637 FOR MEDICARE OP, ALT 636 FOR OP/ED)

## 2024-12-16 RX ORDER — BACLOFEN 10 MG/1
10 TABLET ORAL ONCE
Status: COMPLETED | OUTPATIENT
Start: 2024-12-16 | End: 2024-12-16

## 2024-12-16 RX ORDER — POTASSIUM CHLORIDE 20 MEQ/1
10 TABLET, EXTENDED RELEASE ORAL ONCE
Status: COMPLETED | OUTPATIENT
Start: 2024-12-16 | End: 2024-12-16

## 2024-12-16 ASSESSMENT — PAIN - FUNCTIONAL ASSESSMENT
PAIN_FUNCTIONAL_ASSESSMENT: 0-10

## 2024-12-16 ASSESSMENT — COGNITIVE AND FUNCTIONAL STATUS - GENERAL
WALKING IN HOSPITAL ROOM: TOTAL
PERSONAL GROOMING: A LOT
STANDING UP FROM CHAIR USING ARMS: TOTAL
MOVING FROM LYING ON BACK TO SITTING ON SIDE OF FLAT BED WITH BEDRAILS: A LOT
MOVING TO AND FROM BED TO CHAIR: TOTAL
TOILETING: A LOT
DRESSING REGULAR LOWER BODY CLOTHING: A LOT
TURNING FROM BACK TO SIDE WHILE IN FLAT BAD: A LOT
DRESSING REGULAR UPPER BODY CLOTHING: A LOT
CLIMB 3 TO 5 STEPS WITH RAILING: TOTAL
CLIMB 3 TO 5 STEPS WITH RAILING: TOTAL
TOILETING: A LOT
PERSONAL GROOMING: A LITTLE
MOVING FROM LYING ON BACK TO SITTING ON SIDE OF FLAT BED WITH BEDRAILS: A LITTLE
HELP NEEDED FOR BATHING: A LOT
DRESSING REGULAR UPPER BODY CLOTHING: A LOT
DAILY ACTIVITIY SCORE: 14
MOBILITY SCORE: 11
MOVING TO AND FROM BED TO CHAIR: A LOT
EATING MEALS: A LITTLE
TURNING FROM BACK TO SIDE WHILE IN FLAT BAD: A LITTLE
WALKING IN HOSPITAL ROOM: TOTAL
EATING MEALS: A LITTLE
DRESSING REGULAR LOWER BODY CLOTHING: A LOT
HELP NEEDED FOR BATHING: A LOT
MOBILITY SCORE: 8
DAILY ACTIVITIY SCORE: 13
STANDING UP FROM CHAIR USING ARMS: TOTAL

## 2024-12-16 ASSESSMENT — PAIN SCALES - GENERAL
PAINLEVEL_OUTOF10: 0 - NO PAIN
PAINLEVEL_OUTOF10: 0 - NO PAIN
PAINLEVEL_OUTOF10: 8
PAINLEVEL_OUTOF10: 3
PAINLEVEL_OUTOF10: 0 - NO PAIN
PAINLEVEL_OUTOF10: 3

## 2024-12-16 ASSESSMENT — PAIN DESCRIPTION - LOCATION
LOCATION: LEG

## 2024-12-16 ASSESSMENT — PAIN DESCRIPTION - ORIENTATION
ORIENTATION: LEFT
ORIENTATION: LEFT;RIGHT
ORIENTATION: LEFT

## 2024-12-16 ASSESSMENT — PAIN SCALES - PAIN ASSESSMENT IN ADVANCED DEMENTIA (PAINAD): TOTALSCORE: MEDICATION (SEE MAR)

## 2024-12-16 NOTE — PROGRESS NOTES
Loan Osborne is a 54 y.o. female on day 4 of admission presenting with Acute deep vein thrombosis (DVT) of femoral vein of both lower extremities (Multi).    [employer] consulted for dc planning on 12/16/24.    SW met at bedside with pt to discuss SNF. Pt understood and open to going to SNF. Pt requested referral be sent to [facility]. SW sent referral to SNF.    UPDATE: SW informed by [facility] that they can accept pt.   SW informed pt and her , Mr. Briscoe, of SNF acceptance. Pt and spouse agreeable for pt to go there.    [employer] asked for  team to submit precert.  SW informed auth is pending.     SW will follow.    Pt adod: 12/16/24  Current dc plan: SNF - [facility]  Barrier: fidel Swain MSW [facility]  Care Transitions  2  [phone] or Epic Secure Chat

## 2024-12-16 NOTE — CONSULTS
Wound Care Consult     Visit Date: 12/16/2024      Patient Name: Loan Osborne         MRN: [id number]           YOB: 1970     Reason for Consult: Left labia, right toes          12/16/24 1330   Wound 12/12/24 Skin Tear Other (Comment) Left   Date First Assessed/Time First Assessed: 12/12/24 1904   Present on Original Admission: Yes  Hand Hygiene Completed: Yes  Primary Wound Type: Skin Tear  Location: (c) Other (Comment)  Wound Location Orientation: Left   Wound Image    Site Assessment Red;Painful   Wound Length (cm) 0.5 cm   Wound Width (cm) 0.2 cm   Wound Surface Area (cm^2) 0.1 cm^2   Drainage Description Serous   Drainage Amount Small   Dressing Recommendation TWICE DAILY for groin and labia and as needed with clean-ups Keep clean and dry. Apply Triad wound dressing cream to damaged tissue. TO APPLY TRIAD: Triad wound dressing cream can be applied directly from the tube or by using a gloved finger. Gently spread Triad evenly over the area of application to the thickness of a dime. Reapply Triad wound dressing cream with each clean up and as needed. In the perineal area, reapply Triad after each episode of incontinence. With higher exudate levels requiring more frequent re-applications. ([facility] order number [id number]). When soiled, wash top layer of soiled Triad wound dressing cream off with warm wipes as needed pat dry, then reapply Triad.**EVERY THREE DAYS WASH DOWN TO SKIN. TO REMOVE TRIAD: Use pH-balanced wound cleanser to soften Triad. Gently wipe to remove without scrubbing. Then reapply if needed.      Dressing Changed New     Wound location: Right toes         Undermining: no  Tracking: no  Wound type: chronic skin changes- hyperpigmentation   Wound bed: no open wound peeling dry skin  Draining: none  Periwound skin: dry    Recommendations: DAILY Wash and dry bilateral legs and feet then apply a good moisturizer such as Eucerin/Aquaphor (pharmacy item need a physician order).  Elevate heels  off the bed surface at all times.       While in bed patient should only be on one EHOB air mattress overlay, a fitted sheet, and one EHOB repositioning sheet with appropriate white chux. Please do not use brief while patient is resting in bed. Turn and reposition patient at least every 2 hours.  Elevate heels off the bed surface at all times.       Wound Team Plan:  Primary provider please review the wound care consult note and pending wound care order. If you agree with orders please file in EMR.      While inpatient, Secure chat with questions or reconsult wound care if condition worsens or changes. For urgent communications please message the group through [employer] at: [facility], Thank you.      Jami Whyte RN, CWON  12/16/2024  2:20 PM

## 2024-12-16 NOTE — PROGRESS NOTES
Loan Osborne is a 54 y.o. female on day 4 of admission presenting with Acute deep vein thrombosis (DVT) of femoral vein of both lower extremities (Multi).      Subjective     Patient seen and evaluated at bedside. Seemed very frustrated about breakfast but difficult to communicate what she was frustrated about given aphasia. Refused to answer any questions regarding medical care. Pending SNF placement.     Objective     Last Recorded Vitals  /86   Pulse 92   Temp 34.7 °C (94.5 °F)   Resp 18   Wt 66.3 kg (146 lb 3.2 oz)   SpO2 94%   Intake/Output last 3 Shifts:    Intake/Output Summary (Last 24 hours) at 12/16/2024 0854  Last data filed at 12/16/2024 0600  Gross per 24 hour   Intake 120 ml   Output 2300 ml   Net -2180 ml       Admission Weight  Weight: 69.9 kg (154 lb) (12/11/24 2007)    Daily Weight  12/13/24 : 66.3 kg (146 lb 3.2 oz)    Image Results  XR ankle right 3+ views, XR foot right 1-2 views  Narrative: Interpreted By:  Maxwell Peres,   STUDY:  Right ankle and foot dated 12/12/2024.      INDICATION:  Signs/Symptoms:Ankle pain; Signs/Symptoms:Foot pain      COMPARISON:  Radiographs dated 12/11/2024.      ACCESSION NUMBER(S):  [id number]; [id number]      ORDERING CLINICIAN:  BERNARDO NAPIER      TECHNIQUE:  Three views radiographs of the right ankle and foot.      FINDINGS:  Foot radiographs are limited due to positioning. Bones are  demineralized. No fracture or dislocation is evident. The ankle  mortise is congruent and the tibial plafond and talar dome are  intact.    No ankle joint effusion is evident. No soft tissue gas or  radiopaque foreign body is evident.      Impression: No osseous injury is evident. If there remains concern for osseous  injury, CT is recommended.      MACRO:  None      Signed by: Maxwell Peres 12/12/2024 11:46 AM  Dictation workstation:   [id number]  CT angio chest for pulmonary embolism  Narrative: STUDY:  CT Angiogram of the Chest; 12/12/2024 at 2:34  AM.  INDICATION:  Bilateral lower extremity DVT, shortness of breath.  COMPARISON:  None available.  ACCESSION NUMBER(S):  [id number]  ORDERING CLINICIAN:  MARGARITA MCPHERSON  TECHNIQUE:  CTA of the chest was performed with intravenous contrast.   Images are reviewed and processed at a workstation according to the CT  angiogram protocol with 3-D and/or MIP post processing imaging  generated.  Omnipaque 350 90 mL was administered intravenously.  Automated mA/kV exposure control was utilized and patient examination  was performed in strict accordance with principles of ALARA.  FINDINGS:  Pulmonary arteries are adequately opacified without acute or chronic  filling defects.  The thoracic aorta is normal in course and caliber  without dissection or aneurysm.  The heart is normal in size without pericardial effusion.  Thoracic  lymph nodes are not enlarged.  There is no pleural effusion, pleural thickening, or pneumothorax.   The airways are patent.  Lungs are clear without consolidation, interstitial disease, or  suspicious nodules.  Upper abdomen demonstrates no acute pathology.  There are no acute fractures.  No suspicious bony lesions.  Impression: No evidence of pulmonary embolism.  Signed by Quinten Santana MD  Lower extremity venous duplex right  Narrative: Interpreted By:  Bruno Lr,  and Phyllis Lux   STUDY:  [facility] US LOWER EXTREMITY VENOUS DUPLEX RIGHT;  12/11/2024 10:58 pm      INDICATION:  Signs/Symptoms:Immobilization from stroke, swelling.          COMPARISON:  Duplex lower extremity ultrasound dated 08/11/2020      ACCESSION NUMBER(S):  [id number]      ORDERING CLINICIAN:  AGUSTIN VARGAS      TECHNIQUE:  Vascular ultrasound of the bilateral lower extremities was performed.  Real-time compression views as well as Gray scale, color Doppler and  spectral Doppler waveform analysis was performed.      FINDINGS:  Evaluation of the visualized portions of the bilateral common femoral  vein, proximal, mid, and  distal femoral vein, and popliteal vein were  performed/attempted.      Limitations:  Examination is limited by lack of patient mobility and  small vessels.      The right proximal femoral vein and the left common femoral vein  demonstrate partial compressibility with echogenic material seen  within the lumen. The right common femoral vein and right posterior  tibial vein are visualized and are compressible. The remainder of the  lower extremity veins were not visualized due to the limitations of  the study.          Impression: Examination is limited by lack of patient mobility and small vessels.  Within these limitations:      Partial compressibility of the right proximal femoral vein and left  common femoral vein, most compatible with thrombus.      I personally reviewed the images/study and I agree with the findings  as stated above by resident physician, Jose J Ferguson MD. This study  was interpreted at [facility],  [place], [place].      MACRO:  Critical Finding:  Thrombosis. Notification was initiated on  12/11/2024 at 11:37 pm by  Jose J Ferguson.  (**-OCF-**) Instructions:      Signed by: Bruno Lr 12/12/2024 12:01 AM  Dictation workstation:   [id number]      Physical Exam    Gen: well appearing, A+Ox3, in no acute distress  HEENT: sclera anicteric, no conjunctival injection, MMM  Resp: CTAB, normal breath sounds, no wheezing/crackles  CV: RRR, normal S1/S2  GI: Suprapubic tenderness/ Fullness, non-distended, no rebound or guarding  Extremities/MSK: warm and well perfused, no edema bilateral, Right thigh pain  Spastic, contracted RUE  Neuro: A+Ox3, no focal deficits, no asterixis  Skin: warm and dry, no lesions, no rashes       Assessment & Plan  Acute deep vein thrombosis (DVT) of femoral vein of both lower extremities (Multi)    Loan Osborne is a 54 y.o. 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 Presenting to The ED with the concern of acute right lower extremity pain. Found to have an orthotic brace broken off and poking into her leg which was initially thought to be the source of her pain. DVT study revealed bilateral DVTs - R proximal femoral and L common femoral thrombus, a CT PE is neg. Back on Eliquis.      Updates 12/16:  - Pending SNF placement   - DC on [place] + Urology follow up given failure of TOV      # Acute Bilateral DVT while on Apixaban  # Failure of Apixaban Therapy  # DVT/PE (2017, 2019)   :: LLE DVT in 2019 was attributed to Eliquis, charted as non compliance? Unsure if the patient remained on AC after the first event or it was discontinued , CT venogram during that admission  with evidence of chronic appearance left common iliac and external iliac thrombus   :: June 2017 she had a deep vein thrombosis and pulmonary embolus. She was placed on Xarelto at that time; a prior note states it was stopped in September 2017 after a CT scan.   :: US Doppler Temo LE: Examination is limited by lack of patient mobility and small vessels.  Within these limitations: Partial compressibility of the right proximal femoral vein and left  common femoral vein, most compatible with thrombu.  :: CTPE neg   PLAN:  - Back on Eliquis  - XR negative for fractures of R foot       #\"Cryptogenic\" Stroke 9/2016  :: L MCA, L M1 occlusion, residual severe aphasia and spastic Right hemiparesis.   :: Anticardiolipin antibody, antinuclear antibody, beta-2 glycoprotein DRVVT - all normal.   Workup was not revealing for etiology of her stroke   PLAN:  - Continue Atorvastatin, continue Baclofen from home pending urine output  - Continue Home PT  - as AFO is broken, plan to order a new one before discharge     # Acute urine retention, Dysuria  # Hx of fibroids with previous episodes of menorrhagia   No surgical intervention done for fibroids- per chart review the patient was not agreeable to it. "   Likely iso of acute pain and opioids, UA to rule out infection. Known fibroid can be contributing,   PLAN:  - follow up bladder scan   - Consider a US abdomen/ Gynecological US for evaluation     #High blood pressure reading  :: Not known to have hypertension  :: SBP in the 150s , iso of pain and discomfort.    PLAN:  - CTM, consider starting an antihypertensive medication of BP continues to be high during the admission      # Elevated Liver enzymes  :: ALT 55, AST 47 not previously noted to be elevated. last checked 3/2024  ::  ( noted to be elevated within the same range since 2021)  :: , TG 73, Chol 230 3/2024, HbA1c 5.1  :: no abdominal pain, No vomiting, some nausea associated with IV morphine   PLAN:  - Consider liver US if increase in AM  - hepatitis serology negative   - repeat LDL, HbA1c      # Depression   sertraline (Zoloft) 100 mg         N: regular diet  A: PIV  O2: RA  DVT ppx: Heparin gtt  GI ppx: Not indicated  Code Status: Full code  Contact:  MARGRET VÁZQUEZ (Daughter)  [phone] (Mobile)     Sampson Green MD  Internal Medicine, [facility]

## 2024-12-17 ENCOUNTER — APPOINTMENT (OUTPATIENT)
Dept: RADIOLOGY | Facility: HOSPITAL | Age: 54
DRG: 300 | End: 2024-12-17
Payer: COMMERCIAL

## 2024-12-17 VITALS
WEIGHT: 146.2 LBS | HEART RATE: 93 BPM | BODY MASS INDEX: 23.5 KG/M2 | RESPIRATION RATE: 17 BRPM | HEIGHT: 66 IN | DIASTOLIC BLOOD PRESSURE: 88 MMHG | SYSTOLIC BLOOD PRESSURE: 136 MMHG | OXYGEN SATURATION: 98 % | TEMPERATURE: 99 F

## 2024-12-17 PROCEDURE — 99239 HOSP IP/OBS DSCHRG MGMT >30: CPT

## 2024-12-17 PROCEDURE — 2500000001 HC RX 250 WO HCPCS SELF ADMINISTERED DRUGS (ALT 637 FOR MEDICARE OP)

## 2024-12-17 PROCEDURE — 73110 X-RAY EXAM OF WRIST: CPT | Mod: RT

## 2024-12-17 RX ORDER — DICLOFENAC SODIUM 10 MG/G
4 GEL TOPICAL 4 TIMES DAILY PRN
Start: 2024-12-17

## 2024-12-17 RX ORDER — TAMSULOSIN HYDROCHLORIDE 0.4 MG/1
0.4 CAPSULE ORAL NIGHTLY
Start: 2024-12-17

## 2024-12-17 RX ORDER — DICLOFENAC SODIUM 10 MG/G
4 GEL TOPICAL 4 TIMES DAILY PRN
Status: DISCONTINUED | OUTPATIENT
Start: 2024-12-17 | End: 2024-12-17 | Stop reason: HOSPADM

## 2024-12-17 ASSESSMENT — PAIN DESCRIPTION - ORIENTATION: ORIENTATION: RIGHT

## 2024-12-17 ASSESSMENT — COGNITIVE AND FUNCTIONAL STATUS - GENERAL
WALKING IN HOSPITAL ROOM: TOTAL
TURNING FROM BACK TO SIDE WHILE IN FLAT BAD: A LOT
DRESSING REGULAR UPPER BODY CLOTHING: A LOT
MOVING TO AND FROM BED TO CHAIR: TOTAL
CLIMB 3 TO 5 STEPS WITH RAILING: TOTAL
DRESSING REGULAR LOWER BODY CLOTHING: A LOT
PERSONAL GROOMING: A LITTLE
MOVING FROM LYING ON BACK TO SITTING ON SIDE OF FLAT BED WITH BEDRAILS: A LITTLE
EATING MEALS: A LITTLE
HELP NEEDED FOR BATHING: A LOT
MOBILITY SCORE: 9
STANDING UP FROM CHAIR USING ARMS: TOTAL
DAILY ACTIVITIY SCORE: 14
TOILETING: A LOT

## 2024-12-17 ASSESSMENT — PAIN - FUNCTIONAL ASSESSMENT
PAIN_FUNCTIONAL_ASSESSMENT: 0-10

## 2024-12-17 ASSESSMENT — PAIN SCALES - WONG BAKER: WONGBAKER_NUMERICALRESPONSE: HURTS LITTLE MORE

## 2024-12-17 ASSESSMENT — PAIN SCALES - GENERAL
PAINLEVEL_OUTOF10: 0 - NO PAIN
PAINLEVEL_OUTOF10: 0 - NO PAIN

## 2024-12-17 ASSESSMENT — PAIN DESCRIPTION - LOCATION: LOCATION: HAND

## 2024-12-17 NOTE — PROGRESS NOTES
Auth received for patient to admit to SNF. [facility] confirms ability to accommodate patient today. Medical team updated family on discharge today and pickup time of 530p confirmed by community care ambulance. 7000 completed in [facility] yesterday by JACKSON Gillespie.     JUAN Ray

## 2024-12-17 NOTE — PROGRESS NOTES
Music Therapy Note        Therapy Session  Referral Type: New referral this admission  Session Start Time: 1410  Session End Time: 1412  Intervention Delivery: In-person  Conflict of Service: None  Family Present for Session: None  Number of staff members present: 0     Pre-assessment  Unable to Assess Reason: Outcomes not assessed  Mood/Affect: Appropriate  Verbalized Emotional State: Acceptance         Treatment/Interventions  Music Therapy Interventions: Assessment  Interruption: No    Post-assessment  Unable to Assess Reason: Did not provide expressive therapy intervention  Continue Visiting: Yes  Total Session Time (min): 2 minutes    Narrative  Assessment Detail: Patient found lying in bed. Receptive to education and benefits of music therapy services. Patient stated she doesn't really like or listen to music; speech is better. Patient reported some stress and is open to a music and meditation at a later date.  Follow-up: MT will follow up with patient accordingly    Education Documentation  No documentation found.

## 2024-12-17 NOTE — PROGRESS NOTES
Loan Osborne is a 54 y.o. female on day 5 of admission presenting with Acute deep vein thrombosis (DVT) of femoral vein of both lower extremities (Multi).      Subjective     Patient seen and evaluated at bedside. No acute medical complaints. Pending SNF placement.     Objective     Last Recorded Vitals  /83 (BP Location: Left arm, Patient Position: Lying)   Pulse 92   Temp 36.5 °C (97.7 °F) (Temporal)   Resp 16   Wt 66.3 kg (146 lb 3.2 oz)   SpO2 98%   Intake/Output last 3 Shifts:    Intake/Output Summary (Last 24 hours) at 12/17/2024 0822  Last data filed at 12/17/2024 0500  Gross per 24 hour   Intake 480 ml   Output 1375 ml   Net -895 ml       Admission Weight  Weight: 69.9 kg (154 lb) (12/11/24 2007)    Daily Weight  12/13/24 : 66.3 kg (146 lb 3.2 oz)    Image Results  XR ankle right 3+ views, XR foot right 1-2 views  Narrative: Interpreted By:  Maxwell Peres,   STUDY:  Right ankle and foot dated 12/12/2024.      INDICATION:  Signs/Symptoms:Ankle pain; Signs/Symptoms:Foot pain      COMPARISON:  Radiographs dated 12/11/2024.      ACCESSION NUMBER(S):  [id number]; [id number]      ORDERING CLINICIAN:  BERNARDO NAPIER      TECHNIQUE:  Three views radiographs of the right ankle and foot.      FINDINGS:  Foot radiographs are limited due to positioning. Bones are  demineralized. No fracture or dislocation is evident. The ankle  mortise is congruent and the tibial plafond and talar dome are  intact.    No ankle joint effusion is evident. No soft tissue gas or  radiopaque foreign body is evident.      Impression: No osseous injury is evident. If there remains concern for osseous  injury, CT is recommended.      MACRO:  None      Signed by: Maxwell Peres 12/12/2024 11:46 AM  Dictation workstation:   [id number]  CT angio chest for pulmonary embolism  Narrative: STUDY:  CT Angiogram of the Chest; 12/12/2024 at 2:34 AM.  INDICATION:  Bilateral lower extremity DVT, shortness of breath.  COMPARISON:  None  available.  ACCESSION NUMBER(S):  [id number]  ORDERING CLINICIAN:  MARGARITA MCPHERSON  TECHNIQUE:  CTA of the chest was performed with intravenous contrast.   Images are reviewed and processed at a workstation according to the CT  angiogram protocol with 3-D and/or MIP post processing imaging  generated.  Omnipaque 350 90 mL was administered intravenously.  Automated mA/kV exposure control was utilized and patient examination  was performed in strict accordance with principles of ALARA.  FINDINGS:  Pulmonary arteries are adequately opacified without acute or chronic  filling defects.  The thoracic aorta is normal in course and caliber  without dissection or aneurysm.  The heart is normal in size without pericardial effusion.  Thoracic  lymph nodes are not enlarged.  There is no pleural effusion, pleural thickening, or pneumothorax.   The airways are patent.  Lungs are clear without consolidation, interstitial disease, or  suspicious nodules.  Upper abdomen demonstrates no acute pathology.  There are no acute fractures.  No suspicious bony lesions.  Impression: No evidence of pulmonary embolism.  Signed by Quinten Santana MD  Lower extremity venous duplex right  Narrative: Interpreted By:  Bruno Lr and Benza Andrew   STUDY:  [facility] US LOWER EXTREMITY VENOUS DUPLEX RIGHT;  12/11/2024 10:58 pm      INDICATION:  Signs/Symptoms:Immobilization from stroke, swelling.          COMPARISON:  Duplex lower extremity ultrasound dated 08/11/2020      ACCESSION NUMBER(S):  [id number]      ORDERING CLINICIAN:  AGUSTIN VARGAS      TECHNIQUE:  Vascular ultrasound of the bilateral lower extremities was performed.  Real-time compression views as well as Gray scale, color Doppler and  spectral Doppler waveform analysis was performed.      FINDINGS:  Evaluation of the visualized portions of the bilateral common femoral  vein, proximal, mid, and distal femoral vein, and popliteal vein were  performed/attempted.      Limitations:   Examination is limited by lack of patient mobility and  small vessels.      The right proximal femoral vein and the left common femoral vein  demonstrate partial compressibility with echogenic material seen  within the lumen. The right common femoral vein and right posterior  tibial vein are visualized and are compressible. The remainder of the  lower extremity veins were not visualized due to the limitations of  the study.          Impression: Examination is limited by lack of patient mobility and small vessels.  Within these limitations:      Partial compressibility of the right proximal femoral vein and left  common femoral vein, most compatible with thrombus.      I personally reviewed the images/study and I agree with the findings  as stated above by resident physician, Jose J Ferguson MD. This study  was interpreted at [facility],  [place], [place].      MACRO:  Critical Finding:  Thrombosis. Notification was initiated on  12/11/2024 at 11:37 pm by  Jose J Ferguson.  (**-OCF-**) Instructions:      Signed by: Bruno Lr 12/12/2024 12:01 AM  Dictation workstation:   [id number]      Physical Exam    Gen: well appearing, A+Ox3, in no acute distress  HEENT: sclera anicteric, no conjunctival injection, MMM  Resp: CTAB, normal breath sounds, no wheezing/crackles  CV: RRR, normal S1/S2  GI: Suprapubic tenderness/ Fullness, non-distended, no rebound or guarding  Extremities/MSK: warm and well perfused, no edema bilateral, Right thigh pain  Spastic, contracted RUE  Neuro: A+Ox3, no focal deficits, no asterixis  Skin: warm and dry, no lesions, no rashes       Assessment & Plan  Acute deep vein thrombosis (DVT) of femoral vein of both lower extremities (Multi)    Loan Osborne is a 54 y.o. 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 Presenting to The ED with the concern of acute right lower  "extremity pain. Found to have an orthotic brace broken off and poking into her leg which was initially thought to be the source of her pain. DVT study revealed bilateral DVTs - R proximal femoral and L common femoral thrombus, a CT PE is neg. Back on Eliquis.      Updates 12/17/24 :  - Pending SNF placement   - DC on Milan + Urology follow up given failure of TOV      # Acute Bilateral DVT while on Apixaban  # Failure of Apixaban Therapy  # DVT/PE (2017, 2019)   :: LLE DVT in 2019 was attributed to Eliquis, charted as non compliance? Unsure if the patient remained on AC after the first event or it was discontinued , CT venogram during that admission  with evidence of chronic appearance left common iliac and external iliac thrombus   :: June 2017 she had a deep vein thrombosis and pulmonary embolus. She was placed on Xarelto at that time; a prior note states it was stopped in September 2017 after a CT scan.   :: US Doppler Temo LE: Examination is limited by lack of patient mobility and small vessels.  Within these limitations: Partial compressibility of the right proximal femoral vein and left  common femoral vein, most compatible with thrombu.  :: CTPE neg   PLAN:  - Back on Eliquis  - XR negative for fractures of R foot       #\"Cryptogenic\" Stroke 9/2016  :: L MCA, L M1 occlusion, residual severe aphasia and spastic Right hemiparesis.   :: Anticardiolipin antibody, antinuclear antibody, beta-2 glycoprotein DRVVT - all normal.   Workup was not revealing for etiology of her stroke   PLAN:  - Continue Atorvastatin, continue Baclofen from home pending urine output  - Continue Home PT  - as AFO is broken, plan to order a new one before discharge     # Acute urine retention, Dysuria  # Hx of fibroids with previous episodes of menorrhagia   No surgical intervention done for fibroids- per chart review the patient was not agreeable to it.   Likely iso of acute pain and opioids, UA to rule out infection. Known fibroid can be " contributing,   PLAN:  - follow up bladder scan   - Consider a US abdomen/ Gynecological US for evaluation     #High blood pressure reading  :: Not known to have hypertension  :: SBP in the 150s , iso of pain and discomfort.    PLAN:  - CTM, consider starting an antihypertensive medication of BP continues to be high during the admission      # Elevated Liver enzymes  :: ALT 55, AST 47 not previously noted to be elevated. last checked 3/2024  ::  ( noted to be elevated within the same range since 2021)  :: , TG 73, Chol 230 3/2024, HbA1c 5.1  :: no abdominal pain, No vomiting, some nausea associated with IV morphine   PLAN:  - Consider liver US if increase in AM  - hepatitis serology negative   - repeat LDL, HbA1c      # Depression   sertraline (Zoloft) 100 mg         N: regular diet  A: PIV  O2: RA  DVT ppx: Heparin gtt  GI ppx: Not indicated  Code Status: Full code  Contact:  MARGRET VÁZQUEZ (Daughter)  [phone] (Mobile)     Sampson Green MD  Internal Medicine, [facility]

## 2024-12-17 NOTE — CARE PLAN
Problem: Pain - Adult  Goal: Verbalizes/displays adequate comfort level or baseline comfort level  12/17/2024 0623 by Loan Bender RN  Outcome: Progressing  12/17/2024 0622 by Loan Bender RN  Outcome: Progressing   The patient's goals for the shift include      The clinical goals for the shift include Pt will be free from falls/injuries during this shift

## 2024-12-17 NOTE — NURSING NOTE
Patient discharged to SNF,  IV removed.  Belongings with patient. This nurse attempted to call report, no answer.  Will re-attempt.

## 2024-12-18 NOTE — DISCHARGE SUMMARY
Discharge Diagnosis  Acute deep vein thrombosis (DVT) of femoral vein of both lower extremities (Multi)    Issues Requiring Follow-Up  [ ] Discharge on Milan catheter and follow up with Urology in 1-2 weeks. If nobody from  reaches out, then please contact [phone]   [ ] Follow up with Dr. Palmer in Neurology on 2/24/2025    Discharge Meds     Medication List      START taking these medications     * diclofenac sodium 1 % gel; Commonly known as: Voltaren; Apply 4.5   inches (4 g) topically 4 times a day as needed (wrist pain).   * diclofenac sodium 1 % gel; Commonly known as: Voltaren; Apply 4.5   inches (4 g) topically 4 times a day as needed (r leg pain).   eucerin cream; Apply topically if needed for dry skin.   polyethylene glycol 17 gram packet; Commonly known as: Miralax; Take 17   g by mouth once daily. MIX 1 PACKET IN 8 OUNCES OF LIQUID AND DRINK ONCE   DAILY.; Replaces: Miralax 17 gram/dose powder   tamsulosin 0.4 mg 24 hr capsule; Commonly known as: Flomax; Take 1   capsule (0.4 mg) by mouth once daily at bedtime.  * This list has 2 medication(s) that are the same as other medications   prescribed for you. Read the directions carefully, and ask your doctor or   other care provider to review them with you.     CHANGE how you take these medications     apixaban 5 mg tablet; Commonly known as: Eliquis; Take 2 tablets (10 mg)   by mouth 2 times a day for 6 days, THEN 1 tablet (5 mg) 2 times a day.   Take 2 tablets twice a day through 12/19 in AM, start 1 tablet twice a day   12/19 PM and continue 1 tab twice a day..; Start taking on: December 14, 2024; What changed: See the new instructions.     CONTINUE taking these medications     atorvastatin 80 mg tablet; Commonly known as: Lipitor; Take 1 tablet (80   mg) by mouth once daily.   baclofen 10 mg tablet; Commonly known as: Lioresal; Take 2 tablets (20   mg) by mouth 3 times a day.   Centrum 9 mg iron/ 15 mL (15 mL) oral liquid; Generic drug:  multivitamin   with iron-minerals   ferrous sulfate (325 mg ferrous sulfate) tablet; Take 1 tablet (325 mg)   by mouth once daily with breakfast.     STOP taking these medications     Miralax 17 gram/dose powder; Generic drug: polyethylene glycol; Replaced   by: polyethylene glycol 17 gram packet       Test Results Pending At Discharge  Pending Labs       Order Current Status    Extra Urine Gray Tube Collected (12/12/24 0457)    Urinalysis with Reflex Culture and Microscopic Collected (12/12/24 0457)            Hospital Course  Loan Osborne is a 54 y.o. 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 Presenting to The ED with the concern of acute right lower extremity pain. Found to have an orthotic brace broken off and poking into her leg which was initially thought to be the source of her pain. DVT study revealed bilateral DVTs - R proximal femoral and L common femoral thrombus, a CT PE is neg. The patient was admitted to Medical team for DVT, and started on a Heparin drip.     In the ED:  Vital signs: afebrile, on RA sat > 97%, BP 140s/ 90s, HR 80s  Labs: unremarakble  Imaging: DVT study revealed bilateral DVTs - R proximal femoral and L common femoral thrombus, CT PE is neg.  EKG: normal Sinus rythem  ED Course:  S/p 1 mg Dilaudid IV, Toradol 15 mg IV, Zofran 4 mg IV x2, started on heparin gtt.     While on the floor:  While admitted it was realized through further history that patient was not in fact taking her eliquis at home. The Heparin drip was discontinued, and we restarted the Eliquis on 12/12. Her vital signs and lab work all remained within normal limits while on the floor.     On 12/13 we restarted her Baclofen at 10mg TID, this provided relief of her leg pain. The leg pain was deemed secondary to her known spastic hemiplegia for which she was previously prescribed Baclofen, but according to her and her  dispense history she was not taking.     On 12/14 she was ready for discharge, with instructions to take her medications as prescribed. We ordered home health for her. Pending placement to SNF and was able to leave once accepted.     Follow up:   [ ] Discharge on Milan catheter and follow up with Urology in 1-2 weeks. If nobody from  reaches out, then please contact [phone]   [ ] Follow up with Dr. Palmer in Neurology on 2/24/2025      Pertinent Physical Exam At Time of Discharge  Physical Exam  Gen: well appearing, A+Ox3, in no acute distress  HEENT: sclera anicteric, no conjunctival injection, MMM  Resp: CTAB, normal breath sounds, no wheezing/crackles  CV: RRR, normal S1/S2  GI: Suprapubic tenderness/ Fullness, non-distended, no rebound or guarding  Extremities/MSK: warm and well perfused, no edema bilateral, Right thigh pain  Spastic, contracted RUE  Neuro: A+Ox3, no focal deficits, no asterixis, expressive aphasia   Skin: warm and dry, no lesions, no rashes     Outpatient Follow-Up  Future Appointments   Date Time Provider Department Center   2/24/2025  9:30 AM Jeremiah Palmer MD [id number] [facility]   5/9/2025 12:00 PM Nichelle Mejia MD [id number] [facility]         Al Sousa DO

## 2024-12-19 ENCOUNTER — PATIENT OUTREACH (OUTPATIENT)
Dept: CARE COORDINATION | Facility: CLINIC | Age: 54
End: 2024-12-19
Payer: COMMERCIAL

## 2024-12-19 NOTE — PROGRESS NOTES
Initial outreach call to patient following hospital discharge.   Unable to contact patient nor leave a voice message because  a voicemail system did not .  Will continue to follow.  Rika Elizabeth RN  [phone]

## 2024-12-20 NOTE — PROGRESS NOTES
Art Therapy Note    Loan Osborne     Therapy Session  Referral Type: New referral this admission  Visit Type: New visit  Session Start Time: 1543  Session End Time: 1547  Family Present for Session: None              Treatment/Interventions  Areas of Focus: Anxiety reduction, Stress reduction, Coping  Art Therapy Interventions: Assessment, Education/instruction    Post-assessment  Total Session Time (min): 4 minutes    Narrative  Assessment Detail: Pt was lying in bed, dark room and TV on when ATR visited and introduced AT services and benefits to pt. Pt seemed interested and nodded she was interested, but was not interested in any of the AT suggestions/examples ATR provided. Pt began coughing and appeared like she was going to get sick. ATR asked if she wanted to get the nurse and she nodded. ATR conveyed to nurses station, that pt needed assistance. ATR quickly returned to pt's room and pt was coughing hard, so ATR moved the garbage can to pt's bedside and returned to nurses station to follow up with , who was locating pt's nurse. ATR returned to pt's room and let her know that had alerted nursesdesk. Pt had stopped coughing but was nauseous. ATR conveyed that would stop back another time and pt nodded 'okay'.    Education Documentation  No documentation found.

## 2025-01-02 ENCOUNTER — APPOINTMENT (OUTPATIENT)
Dept: RADIOLOGY | Facility: HOSPITAL | Age: 55
End: 2025-01-02
Payer: COMMERCIAL

## 2025-01-02 ENCOUNTER — HOSPITAL ENCOUNTER (EMERGENCY)
Facility: HOSPITAL | Age: 55
Discharge: HOME | End: 2025-01-03
Attending: STUDENT IN AN ORGANIZED HEALTH CARE EDUCATION/TRAINING PROGRAM
Payer: COMMERCIAL

## 2025-01-02 DIAGNOSIS — M79.606 PAIN OF LOWER EXTREMITY, UNSPECIFIED LATERALITY: Primary | ICD-10-CM

## 2025-01-02 PROCEDURE — 99284 EMERGENCY DEPT VISIT MOD MDM: CPT | Mod: 25 | Performed by: STUDENT IN AN ORGANIZED HEALTH CARE EDUCATION/TRAINING PROGRAM

## 2025-01-02 PROCEDURE — 2500000001 HC RX 250 WO HCPCS SELF ADMINISTERED DRUGS (ALT 637 FOR MEDICARE OP): Performed by: NURSE PRACTITIONER

## 2025-01-02 PROCEDURE — 99284 EMERGENCY DEPT VISIT MOD MDM: CPT | Performed by: NURSE PRACTITIONER

## 2025-01-02 PROCEDURE — 93971 EXTREMITY STUDY: CPT | Performed by: RADIOLOGY

## 2025-01-02 PROCEDURE — 93971 EXTREMITY STUDY: CPT

## 2025-01-02 RX ORDER — ACETAMINOPHEN 325 MG/1
975 TABLET ORAL ONCE
Status: COMPLETED | OUTPATIENT
Start: 2025-01-02 | End: 2025-01-02

## 2025-01-02 RX ADMIN — ACETAMINOPHEN 975 MG: 325 TABLET ORAL at 23:22

## 2025-01-02 ASSESSMENT — LIFESTYLE VARIABLES
TOTAL SCORE: 0
HAVE YOU EVER FELT YOU SHOULD CUT DOWN ON YOUR DRINKING: NO
EVER HAD A DRINK FIRST THING IN THE MORNING TO STEADY YOUR NERVES TO GET RID OF A HANGOVER: NO
HAVE PEOPLE ANNOYED YOU BY CRITICIZING YOUR DRINKING: NO
EVER FELT BAD OR GUILTY ABOUT YOUR DRINKING: NO

## 2025-01-03 ENCOUNTER — PATIENT OUTREACH (OUTPATIENT)
Dept: CARE COORDINATION | Facility: CLINIC | Age: 55
End: 2025-01-03
Payer: COMMERCIAL

## 2025-01-03 VITALS
TEMPERATURE: 97.8 F | RESPIRATION RATE: 17 BRPM | SYSTOLIC BLOOD PRESSURE: 124 MMHG | WEIGHT: 146.16 LBS | HEART RATE: 64 BPM | OXYGEN SATURATION: 97 % | BODY MASS INDEX: 22.94 KG/M2 | DIASTOLIC BLOOD PRESSURE: 81 MMHG | HEIGHT: 67 IN

## 2025-01-03 ASSESSMENT — PAIN DESCRIPTION - PROGRESSION: CLINICAL_PROGRESSION: NOT CHANGED

## 2025-01-03 ASSESSMENT — PAIN SCALES - GENERAL: PAINLEVEL_OUTOF10: 0 - NO PAIN

## 2025-01-03 ASSESSMENT — PAIN - FUNCTIONAL ASSESSMENT: PAIN_FUNCTIONAL_ASSESSMENT: 0-10

## 2025-01-03 NOTE — PROGRESS NOTES
Outreach call to patient to support a smooth transition of care from recent ED admission.  Left voicemail message for patient with my contact information.     Kisha Nunez RN, [facility]  Phone [phone]

## 2025-01-03 NOTE — ED TRIAGE NOTES
Enters ED as transfer from [facility] for c/o RLE pain. Baseline hx of CVA with R-sided residual weakness, DVT, anxiety & depression

## 2025-01-03 NOTE — ED PROVIDER NOTES
HPI   Chief Complaint   Patient presents with    Leg Pain       HPI  This is a 54 year old female with a previous left MCA stroke in 2019 with right sided spastic hemiplegia and aphasia, bilateral DVT on Eliquis presents to the ED today with right lower extremity pain that it is located primarily in the right lower leg.   She was seen a few days ago with pain in the right lower leg and it was noted that she has a broken splint that was removed and at that time, Ultrasound showed bilateral lower extremity DVTs for which she was admitted to the hospital and on Heparin.   On arrival, she is able to answer with Yes/no and right. She denies chest pain and/or dyspnea.    Lower extremities are warm and dry with no swelling and +2 bilateral pedal and post tib pulses. She has pain with light touch over the right lower extremity. No deformity or obvious sign of trauma noted.   The patient resides at [place].     Patient History   Past Medical History:   Diagnosis Date    Benign neoplasm of connective and other soft tissue, unspecified 06/22/2021    Fibroids    Pain in unspecified foot 11/18/2022    Pain of foot, unspecified laterality    Personal history of other medical treatment 06/07/2017    History of screening mammography    Unspecified sequelae of cerebral infarction 08/12/2022    Chronic ischemic left MCA stroke     Past Surgical History:   Procedure Laterality Date    LYMPHADENECTOMY  08/13/2014    Lymphadenectomy    MR HEAD ANGIO WO IV CONTRAST  9/16/2016    MR HEAD ANGIO WO IV CONTRAST 9/16/2016 [facility]    MR NECK ANGIO W IV CONTRAST  9/16/2016    MR NECK ANGIO W IV CONTRAST 9/16/2016 [facility]    TUBAL LIGATION  08/13/2014    Tubal Ligation     Family History   Problem Relation Name Age of Onset    Other ((CVA)) Mother      Hypertension Mother      Kidney cancer Father      Other ((CVA)) Other GRANDFATHER      Social History     Tobacco Use    Smoking status: Never    Smokeless tobacco:  Never   Substance Use Topics    Alcohol use: Never    Drug use: Never       Physical Exam   ED Triage Vitals   Temp Pulse Resp BP   -- -- -- --      SpO2 Temp src Heart Rate Source Patient Position   -- -- -- --      BP Location FiO2 (%)     -- --       Physical Exam  Constitutional:       Appearance: Normal appearance.   HENT:      Head: Normocephalic and atraumatic.      Mouth/Throat:      Mouth: Mucous membranes are moist.   Eyes:      Extraocular Movements: Extraocular movements intact.      Pupils: Pupils are equal, round, and reactive to light.   Cardiovascular:      Rate and Rhythm: Normal rate and regular rhythm.   Pulmonary:      Effort: Pulmonary effort is normal.      Breath sounds: Normal breath sounds.   Abdominal:      Palpations: Abdomen is soft.   Musculoskeletal:         General: Tenderness present. No swelling, deformity or signs of injury. Normal range of motion.      Cervical back: Normal range of motion and neck supple.      Right lower leg: No edema.      Left lower leg: No edema.   Skin:     General: Skin is warm and dry.   Neurological:      Mental Status: She is alert. Mental status is at baseline.      Comments: Right sided weakness which is unchanged   Aphasia which is unchanged    Psychiatric:         Mood and Affect: Mood normal.           ED Course & MDM   ED Course as of 01/08/25 0233   Thu Jan 02, 2025   1477 Attending summary:  55 y/o F with PMHx prior stroke with residual aphasia (answers yes/no to questions) and R hemiparesis, DVT/PE on eliquis who is presenting for atraumatic R leg pain. All her medications are administered to her, she reports she has been taking them. No trauma. No numbness or tingling. Vitals unremarkable. Exam shows well appearing female with baseline R hemiparesis, no bilateral LE edema or erythema, no deformities, 2+ DP pulses bilaterally. Mild posterior calf tenderness on R. Duplex RLE with known partial thrombus similar compared to prior, no acute DVT. No  deformities or trauma concerning for fx/dislocation requiring XR. No signs of arterial ischemia. Will dc back to facility.  [SS]      ED Course User Index  [SS] Carlyn Mclaughlin MD                 No data recorded     Vinod Coma Scale Score: 15 (01/02/25 2214 : Ana M Toscano RN)                           Medical Decision Making  This is a 54 year old female with a previous left MCA stroke in 2019 with right sided spastic hemiplegia and aphasia, bilateral DVT on Eliquis presents to the ED today with right lower extremity pain that it is located primarily in the right lower leg.   She was seen a few days ago with pain in the right lower leg and it was noted that she has a broken splint that was removed and at that time, Ultrasound showed bilateral lower extremity DVTs for which she was admitted to the hospital and on Heparin.   On arrival, she is able to answer with Yes/no and right. She denies chest pain and/or dyspnea.    Lower extremities are warm and dry with no swelling and +2 bilateral pedal and post tib pulses. She has pain with light touch over the right lower extremity. No deformity or obvious sign of trauma noted.     Ultrasound of the right lower extremity is ordered and at the time of sign out, US is pending. No labs at this time since she had no other concerning symptoms. She was given 975 mg of Tylenol for pain.   She was staffed with Dr. Mclaughlin and was signed out to the night shift provider.     Procedure  Procedures     LANA Fletcher-CNP, DNP  01/02/25 8321       Carlyn Mclaughlin MD  01/08/25 1018

## 2025-01-03 NOTE — DISCHARGE INSTRUCTIONS
You have been evaluated in the Emergency Department today for leg pain. Your evaluation, including DVT ultrasound, suggests that your symptoms are due to your chronic DVT but no new DVT..    Please follow up with your primary care physician within two days. If you do not have a primary care doctor you may call [phone] to make an appointment.    Return to the Emergency Department if you experience worsening pain, swelling. Return to the Emergency Department if you develop any new or worsening symptoms.   Thank you for choosing us for your care.

## 2025-01-03 NOTE — PROGRESS NOTES
I received Loan Osborne in signout from outgoing provider.  Please see the previous note for all HPI, PE and MDM up to the time of signout at 2300.    In brief Loan Osborne is an 54 y.o. female presenting for leg pain.  Chief Complaint   Patient presents with    Leg Pain   .  At the time of signout we were awaiting:  Final results of a DVT ultrasound and disposition  During my care patient's DVT ultrasound showed that she had a partial thrombus within the right proximal femoral vein and left common femoral vein.  This does not demonstrate treatment failure.  The patient at this time is stable and appropriate for discharge.  She was given return precautions and discharged.  Patient seen and discussed with attending of record.    Denis Stuart MD

## 2025-01-10 ENCOUNTER — PATIENT OUTREACH (OUTPATIENT)
Dept: CARE COORDINATION | Facility: CLINIC | Age: 55
End: 2025-01-10
Payer: COMMERCIAL

## 2025-01-10 NOTE — PROGRESS NOTES
The patient's father answered the phone and stated that the patient is currently an inpatient at [facility].    Will close this episode.    Rika Elizabeth RN  [phone]

## 2025-01-15 ENCOUNTER — OFFICE VISIT (OUTPATIENT)
Dept: UROLOGY | Facility: CLINIC | Age: 55
End: 2025-01-15
Payer: COMMERCIAL

## 2025-01-15 ENCOUNTER — APPOINTMENT (OUTPATIENT)
Dept: UROLOGY | Facility: CLINIC | Age: 55
End: 2025-01-15
Payer: COMMERCIAL

## 2025-01-15 DIAGNOSIS — R33.9 URINARY RETENTION: Primary | ICD-10-CM

## 2025-01-15 DIAGNOSIS — G83.4: ICD-10-CM

## 2025-01-15 PROCEDURE — 51700 IRRIGATION OF BLADDER: CPT | Performed by: PHYSICIAN ASSISTANT

## 2025-01-15 PROCEDURE — 99204 OFFICE O/P NEW MOD 45 MIN: CPT | Performed by: PHYSICIAN ASSISTANT

## 2025-01-15 ASSESSMENT — ENCOUNTER SYMPTOMS
MUSCULOSKELETAL NEGATIVE: 1
NEUROLOGICAL NEGATIVE: 1
ALLERGIC/IMMUNOLOGIC NEGATIVE: 1
RESPIRATORY NEGATIVE: 1
CARDIOVASCULAR NEGATIVE: 1
DIFFICULTY URINATING: 1
HEMATOLOGIC/LYMPHATIC NEGATIVE: 1
ENDOCRINE NEGATIVE: 1
CONSTITUTIONAL NEGATIVE: 1
PSYCHIATRIC NEGATIVE: 1
EYES NEGATIVE: 1
GASTROINTESTINAL NEGATIVE: 1

## 2025-01-15 NOTE — PROGRESS NOTES
Subjective   Patient ID: Loan Osborne is a 54 y.o. female who presents for No chief complaint on file..  HPI  Patient is a 55 yo female with history of stroke about 7 years ago. Was recently hospitalized due to DVT of femoral vein in both extremities on 12/12/24. Developed urinary retention while hospitalized holding 1100 had Jean catheter placed presents for evaluation.     Patient presents with her aunt, since the stroke she is not able to verbalized what she wants to say. History given by her Aunt     She denies any difficulty urinating prior to hospitalization in December. She denies history of recurrent UTIs or hematuria.     Urine culture in hospital was negative for UTI.    Voiding trial failed in the office. Patient had 500cc placed she did not feel an urge. Jean catheter removed. She started voiding, but she did not feel that she was voiding.   She voided 150cc.     16 fr jean catheter was replaced using sterile technique. Balloon filled with 10cc    Patient was very tearful when Jean catheter was replaced. She reports the catheter has been irritating.      Review of Systems   Constitutional: Negative.    HENT: Negative.     Eyes: Negative.    Respiratory: Negative.     Cardiovascular: Negative.    Gastrointestinal: Negative.    Endocrine: Negative.    Genitourinary:  Positive for difficulty urinating.   Musculoskeletal: Negative.    Skin: Negative.    Allergic/Immunologic: Negative.    Neurological: Negative.    Hematological: Negative.    Psychiatric/Behavioral: Negative.         Objective   Physical Exam  Constitutional:       General: She is not in acute distress.     Appearance: Normal appearance.   HENT:      Head: Normocephalic and atraumatic.      Nose: Nose normal.      Mouth/Throat:      Mouth: Mucous membranes are dry.   Pulmonary:      Effort: Pulmonary effort is normal.   Abdominal:      General: Abdomen is flat.      Palpations: Abdomen is soft.   Musculoskeletal:      Cervical back:  Normal range of motion and neck supple.      Comments: Weakness of the left upper and lower extremity    Skin:     General: Skin is warm and dry.   Neurological:      General: No focal deficit present.      Mental Status: She is alert and oriented to person, place, and time.   Psychiatric:         Mood and Affect: Mood normal.         Assessment/Plan     Urinary retention  Milan catheter in placed  I advised proceeding with UDS for further evaluation of retention. Follow up with Dr Ray for further management.   If needed she can follow up in 4 weeks for a catheter change.     I briefly discussed CIC if one of her family members are willing to learn to do it for her.        Rachel Irving PA-C 01/15/25 1:04 PM

## 2025-01-21 ENCOUNTER — APPOINTMENT (OUTPATIENT)
Dept: UROLOGY | Facility: CLINIC | Age: 55
End: 2025-01-21
Payer: COMMERCIAL

## 2025-01-21 DIAGNOSIS — R33.9 URINARY RETENTION: Primary | ICD-10-CM

## 2025-01-21 DIAGNOSIS — G83.4: ICD-10-CM

## 2025-01-21 LAB
POC APPEARANCE, URINE: CLEAR
POC BILIRUBIN, URINE: NEGATIVE
POC BLOOD, URINE: ABNORMAL
POC COLOR, URINE: YELLOW
POC GLUCOSE, URINE: NEGATIVE MG/DL
POC KETONES, URINE: NEGATIVE MG/DL
POC LEUKOCYTES, URINE: ABNORMAL
POC NITRITE,URINE: NEGATIVE
POC PH, URINE: 8.5 PH
POC PROTEIN, URINE: ABNORMAL MG/DL
POC SPECIFIC GRAVITY, URINE: 1.01
POC UROBILINOGEN, URINE: 0.2 EU/DL

## 2025-01-21 PROCEDURE — 51784 ANAL/URINARY MUSCLE STUDY: CPT | Performed by: UROLOGY

## 2025-01-21 PROCEDURE — 51741 ELECTRO-UROFLOWMETRY FIRST: CPT | Performed by: UROLOGY

## 2025-01-21 PROCEDURE — 51728 CYSTOMETROGRAM W/VP: CPT | Performed by: UROLOGY

## 2025-01-21 PROCEDURE — 51797 INTRAABDOMINAL PRESSURE TEST: CPT | Performed by: UROLOGY

## 2025-01-21 PROCEDURE — 81003 URINALYSIS AUTO W/O SCOPE: CPT | Performed by: UROLOGY

## 2025-01-21 NOTE — PROGRESS NOTES
Loan Osborne 53 y/o female here with aunt    Patient was referred by Rachel Irving to evaluate urinary retention.  Dr. Bourgeois was present in the office at time of study.      Pre uroflow was not completed today patient presents with a 16fr jean catheter.  Urine was clear for UTI.      Patient did not leak on CLPP/VLPP.  Patient did not have DO with leak.  PVR after study was 475 ml.  16 fr jean replaced at the end of study successfully.    Patient instructed to increase fluids if blood in the urine or burning due to cath insertion.  Patient understood and consented to Urodynamics.  Patient will follow up with Dr. Ray to review results.  1/21/25/ CM

## 2025-02-07 ENCOUNTER — APPOINTMENT (OUTPATIENT)
Dept: UROLOGY | Facility: CLINIC | Age: 55
End: 2025-02-07
Payer: COMMERCIAL

## 2025-02-07 VITALS — DIASTOLIC BLOOD PRESSURE: 85 MMHG | SYSTOLIC BLOOD PRESSURE: 135 MMHG | TEMPERATURE: 97.2 F | HEART RATE: 71 BPM

## 2025-02-07 DIAGNOSIS — R32 URINARY INCONTINENCE, UNSPECIFIED TYPE: ICD-10-CM

## 2025-02-07 RX ORDER — TAMSULOSIN HYDROCHLORIDE 0.4 MG/1
0.4 CAPSULE ORAL DAILY
Qty: 30 CAPSULE | Refills: 0 | Status: SHIPPED | OUTPATIENT
Start: 2025-02-07 | End: 2025-05-08

## 2025-02-07 ASSESSMENT — ENCOUNTER SYMPTOMS
DEPRESSION: 0
OCCASIONAL FEELINGS OF UNSTEADINESS: 0

## 2025-02-07 ASSESSMENT — PATIENT HEALTH QUESTIONNAIRE - PHQ9
1. LITTLE INTEREST OR PLEASURE IN DOING THINGS: NOT AT ALL
2. FEELING DOWN, DEPRESSED OR HOPELESS: NOT AT ALL
SUM OF ALL RESPONSES TO PHQ9 QUESTIONS 1 AND 2: 0

## 2025-02-07 NOTE — PROGRESS NOTES
Subjective   Patient ID: Loan Osborne is a 54 y.o. female who presents for under active bladder  HPI  54 y.o.  patient presenting as a referral from Rachel Irving  with complaints of urinary retention and to review UDS.    Patient presents with her aunt, since the stroke she is not able to verbalized what she wants to say. History given by her Aunt       Patient with history of stroke about 7 years ago. Was recently hospitalized due to DVT of femoral vein in both extremities on 12/12/24. She developed urinary retention with 1100 ml in her bladder, in the hospital and a jean placed.     She denies any difficulty urinating prior to hospitalization in December. She denies history of recurrent UTIs or hematuria.     Last seen by Rachel Irving 1/15/2025:  Voiding trial failed in the office. Patient had 500cc placed she did not feel an urge. Jean catheter removed. She started voiding, but she did not feel that she was voiding.  She voided 150cc.  16 fr jean catheter was replaced using sterile technique. Balloon filled with 10cc.      Review of Systems  PHYSICAL EXAMINATION:  No LMP recorded.  There is no height or weight on file to calculate BMI.  Visit Vitals  Smoking Status Never     General Appearance: well appearing  Neuro: Alert and oriented       Urine dip: No results found for this or any previous visit (from the past 6 hours).    Imaging and results:  UDS 1/22/2025:  Review of urodynamic testing revealed no uroflow.  Patient was filled with 501cc, there was no over activity.  There was  evidence of detrusor over activity at 13 cm of H2O.  On pressure flow study, the patient was given permission to void and voided 5 cc with a peak flow of 3.1 cc/sec. Detrusor pressure at maximum flow at 14 cm of H2O. Patient left 475 cc residual. Results are consistent with urinary retention secondary to underactive bladder        Assessment/Plan   54 y.o.  patient with h/o stroke 7 years and recent hospitalization  presenting as a referral from Rachel Irving  with complaints of urinary retention and to review UDS.    We reviewed her the UDS which showed urinary retention secondary to underactive bladder, the etiology of which is unknown.She recently failed a trial void with Rachel Markjoe 1/15. In order to give her another chance and getting the jean put, we will initiate Tamsulosin 0.4 mg daily for 1 weeks. Side effects of the medication were discussed with the patient including dizziness, drowsiness, weakness, nausea, diarrhea, headache, retrograde ejaculation, back pain, and runny nose. Patient understands risks and wishes to proceed. We will have the patient follow up in 1 week with NP for another trial of void.     We discussed should the patient fail her trial of void we will continue to have the jean in place and change it every 4- weeks. We also discussed the other option of utilizing CIC.     All questions were answered. Patient agrees with the plan and wishes to proceed. Follow-up will be scheduled appropriately.  .     Scribe Attestation  By signing my name below, ISallie Scribe attest that this documentation has been prepared under the direction and in the presence of Saud Ray MD. All medical record entries made by the Scribe were at my direction or personally dictated by me. I have reviewed the chart and agree that the record accurately reflects my personal performance of the history, physical exam, discussion and plan.

## 2025-02-11 ENCOUNTER — TELEPHONE (OUTPATIENT)
Dept: UROLOGY | Facility: CLINIC | Age: 55
End: 2025-02-11
Payer: COMMERCIAL

## 2025-02-13 ENCOUNTER — APPOINTMENT (OUTPATIENT)
Dept: UROLOGY | Facility: CLINIC | Age: 55
End: 2025-02-13
Payer: COMMERCIAL

## 2025-02-17 ENCOUNTER — APPOINTMENT (OUTPATIENT)
Dept: UROLOGY | Facility: CLINIC | Age: 55
End: 2025-02-17
Payer: COMMERCIAL

## 2025-02-18 ENCOUNTER — APPOINTMENT (OUTPATIENT)
Dept: UROLOGY | Facility: CLINIC | Age: 55
End: 2025-02-18
Payer: COMMERCIAL

## 2025-02-25 ENCOUNTER — APPOINTMENT (OUTPATIENT)
Dept: UROLOGY | Facility: CLINIC | Age: 55
End: 2025-02-25
Payer: COMMERCIAL

## 2025-02-26 ENCOUNTER — CLINICAL SUPPORT (OUTPATIENT)
Dept: UROLOGY | Facility: CLINIC | Age: 55
End: 2025-02-26
Payer: COMMERCIAL

## 2025-02-26 ENCOUNTER — TELEPHONE (OUTPATIENT)
Dept: UROLOGY | Facility: CLINIC | Age: 55
End: 2025-02-26

## 2025-02-26 VITALS — SYSTOLIC BLOOD PRESSURE: 141 MMHG | HEART RATE: 88 BPM | DIASTOLIC BLOOD PRESSURE: 88 MMHG

## 2025-02-26 DIAGNOSIS — R33.9 URINARY RETENTION: ICD-10-CM

## 2025-02-26 PROCEDURE — 99211 OFF/OP EST MAY X REQ PHY/QHP: CPT | Performed by: NURSE PRACTITIONER

## 2025-02-26 NOTE — TELEPHONE ENCOUNTER
While in appointment with nurse, caregiver asked when should pt follow up with provider for self catheterization follow up. Advised pt and caregiver this would be sent to provider and provider's nurse for review. Please advise.

## 2025-02-26 NOTE — PROGRESS NOTES
Pt presented for trial of void and catheter removal. Pt tolerated trial of void well. Pt tolerated catheter removal well. Advised pt and caregiver of normal/abnormal signs and symptoms of urinary retention and when to seek provider or ER care. Pt and caregiver stated understanding.  Pt was taught self catheterization. Pt was successful in self catheterization insertion. Provided pt samples of 12 Fr female self catheterization supplies. Ordered pt supplies through [facility]. Advised pt and caregiver that someone from [facility] [employer] should be reaching out in the next 24-48 hours for shipping. Caregiver stated understanding.

## 2025-02-27 DIAGNOSIS — I82.402 ACUTE DEEP VEIN THROMBOSIS (DVT) OF LEFT LOWER EXTREMITY, UNSPECIFIED VEIN (MULTI): ICD-10-CM

## 2025-03-18 NOTE — TELEPHONE ENCOUNTER
Patient called to confirm if we received email with PDF forms. After reviewing, we still have not received any forms. Advised patient to send PDF forms via [employer]. Patient agreed    Type of Leave: Disab  Reason for Leave: Pregnancy  Start date of leave: On or near [place] 4/3/24  End date of leave: 6 wks vaginal/8 wks c section      Patient's care giver reached out about her prescription for flomax 0.4 mg. She states Loan has already been on Flomax 0.4 mg back in December and failed her TOV in January. Per Dr. Ray patient advised to increase dose to 0.8 mg daily and follow up for TOV with an NP in 2 weeks. Patient in agreement with plan of care.

## 2025-04-02 ENCOUNTER — APPOINTMENT (OUTPATIENT)
Dept: UROLOGY | Facility: CLINIC | Age: 55
End: 2025-04-02
Payer: COMMERCIAL

## 2025-04-17 DIAGNOSIS — Z86.73 CHRONIC ISCHEMIC LEFT MCA STROKE: ICD-10-CM

## 2025-04-17 RX ORDER — GABAPENTIN 100 MG/1
CAPSULE ORAL
COMMUNITY
Start: 2025-01-17

## 2025-04-24 RX ORDER — ATORVASTATIN CALCIUM 80 MG/1
80 TABLET, FILM COATED ORAL DAILY
Qty: 30 TABLET | Refills: 0 | Status: SHIPPED | OUTPATIENT
Start: 2025-04-24 | End: 2025-05-24

## 2025-05-27 ENCOUNTER — TELEPHONE (OUTPATIENT)
Facility: HOSPITAL | Age: 55
End: 2025-05-27
Payer: COMMERCIAL

## 2025-05-27 NOTE — TELEPHONE ENCOUNTER
Communication received pt is requesting refill of atorvastatin. This nurse noted pt in need of FUV. Call placed to encourage scheduling. Appt scheduled for this Friday with PCP as DB, provider made aware. Pt father made aware of 72-hr task completion policy and confirmed understanding. Pt father confirmed supply of available atorvastatin d/t pharmacy supplied 30-day supply, as a convenience s/p failed refill sent via previous request, which caused inconvenience of $18 payment d/t lack of 90-day supply per insurance policy to avoid co-pay.  
exertion

## 2025-05-30 ENCOUNTER — OFFICE VISIT (OUTPATIENT)
Facility: HOSPITAL | Age: 55
End: 2025-05-30
Payer: COMMERCIAL

## 2025-05-30 VITALS
HEART RATE: 64 BPM | WEIGHT: 155 LBS | BODY MASS INDEX: 24.91 KG/M2 | TEMPERATURE: 97 F | DIASTOLIC BLOOD PRESSURE: 85 MMHG | HEIGHT: 66 IN | SYSTOLIC BLOOD PRESSURE: 145 MMHG | OXYGEN SATURATION: 90 %

## 2025-05-30 DIAGNOSIS — Z86.73 CHRONIC ISCHEMIC LEFT MCA STROKE: ICD-10-CM

## 2025-05-30 DIAGNOSIS — Z12.31 ENCOUNTER FOR SCREENING MAMMOGRAM FOR MALIGNANT NEOPLASM OF BREAST: ICD-10-CM

## 2025-05-30 DIAGNOSIS — E78.2 MIXED HYPERLIPIDEMIA: ICD-10-CM

## 2025-05-30 DIAGNOSIS — I82.402 ACUTE DEEP VEIN THROMBOSIS (DVT) OF LEFT LOWER EXTREMITY, UNSPECIFIED VEIN: Primary | ICD-10-CM

## 2025-05-30 DIAGNOSIS — N95.0 POSTMENOPAUSAL VAGINAL BLEEDING: ICD-10-CM

## 2025-05-30 PROBLEM — I63.9 CEREBROVASCULAR ACCIDENT (CVA) OF UNCERTAIN PATHOLOGY (MULTI): Status: ACTIVE | Noted: 2025-05-30

## 2025-05-30 RX ORDER — ATORVASTATIN CALCIUM 80 MG/1
80 TABLET, FILM COATED ORAL DAILY
Qty: 90 TABLET | Refills: 3 | Status: SHIPPED | OUTPATIENT
Start: 2025-05-30 | End: 2026-05-30

## 2025-05-30 RX ORDER — ATORVASTATIN CALCIUM 80 MG/1
80 TABLET, FILM COATED ORAL DAILY
Qty: 30 TABLET | Refills: 0 | Status: SHIPPED | OUTPATIENT
Start: 2025-05-30 | End: 2025-05-30

## 2025-05-30 ASSESSMENT — PAIN SCALES - GENERAL: PAINLEVEL_OUTOF10: 0-NO PAIN

## 2025-05-30 ASSESSMENT — ENCOUNTER SYMPTOMS
ACTIVITY CHANGE: 0
FEVER: 0
RESPIRATORY NEGATIVE: 1
CARDIOVASCULAR NEGATIVE: 1
CHILLS: 0

## 2025-05-30 NOTE — PATIENT INSTRUCTIONS
It was so great to see you today Loan Osborne  . Today we discussed:    - ONLY need to take Baclofen as needed for muscle spasms/contractors or muscle pain and can take up to 3 times a day if needed. Can make patient drowsy  -IF patient does not have a period for over a year, then that is considered in monopause. IF has bleeding in menopause hat is not normal and should be evaluated   - Follow up with Medicare Wellness Exam      Call [phone]  For Care if you need to schedule appointment with specialist or images.  IF any labs were ordered today, I will CALL if labs are ABNORMAL. If labs are NORMAL then I will comment on MyChart and mail results to you.    Follow up in       You were see by:    Dr. Cary Vázquez D.O.  [facility]   Phone: [phone]

## 2025-05-30 NOTE — PROGRESS NOTES
"Subjective   Patient ID: Loan Osborne is a 54 y.o. female with PMH previous left MCA stroke in 2019 with right sided spastic hemiplegia and aphasia, bilateral DVT on Eliquis who presents for Follow-up and Med Refill.    HPI     #Hx of DVT  - DVT in 12/2024 while on Eliquis but was taking 1x daily instead of twice daily  - needs refills on mediations  - denies active bleeding  - denies calf pain, swelling, or erythema  - Also did note may have had vaginal bleeding? Patient and father unable to provide clear answer if patient is in Menopause. Father reports that patient asked for \"pads\" a few months ago. Given patient's Aphasia, she could not clarify     #Hx of Stroke  - Has HH aid  - lives with  & two daughters (16 & 18 yrs old)  - Accompanied by Father at visit who reports her medications are in a pill box and patient takes independently but unsure need for other medications list    #Medication refill  - on statin  - questions on why patient is on Baclofen & gabapentin    Review of Systems   Constitutional:  Negative for activity change, chills and fever.   HENT: Negative.     Respiratory: Negative.     Cardiovascular: Negative.    Skin: Negative.        Objective   /85 (BP Location: Left arm, Patient Position: Sitting)   Pulse 64   Temp 36.1 °C (97 °F) (Temporal)   Ht 1.676 m (5' 6\")   Wt 70.3 kg (155 lb)   SpO2 90%   BMI 25.02 kg/m²     Physical Exam  Constitutional:       General: She is not in acute distress.     Comments: Verbal communication limited. Can follow commands and mostly responds with nodding or shaking her head   HENT:      Head: Normocephalic and atraumatic.   Eyes:      Conjunctiva/sclera: Conjunctivae normal.   Cardiovascular:      Rate and Rhythm: Normal rate and regular rhythm.   Pulmonary:      Effort: Pulmonary effort is normal.      Breath sounds: Normal breath sounds.   Abdominal:      General: There is no distension.      Palpations: Abdomen is soft.      Tenderness: " "There is no abdominal tenderness.   Musculoskeletal:      Right lower leg: No edema.      Left lower leg: No edema.   Skin:     General: Skin is warm and dry.   Neurological:      Mental Status: She is alert. Mental status is at baseline.   Psychiatric:         Behavior: Behavior normal.       Lab Results   Component Value Date    CHOL 239 (H) 12/12/2024    CHOL 230 (H) 03/14/2024    CHOL 206 (H) 06/09/2021     Lab Results   Component Value Date    .7 12/12/2024    .7 03/14/2024    .5 06/09/2021     Lab Results   Component Value Date    LDLCALC 82 12/12/2024    LDLCALC 101 (H) 03/14/2024     Lab Results   Component Value Date    TRIG 182 (H) 12/12/2024    TRIG 73 03/14/2024    TRIG 98 06/09/2021     No components found for: \"CHOLHDL\"    Assessment/Plan   Loan Osborne is a 54 y.o. female with PMH previous left MCA stroke in 2019 with right sided spastic hemiplegia and aphasia, bilateral DVT on Eliquis who presents for Follow-up and Med Refill.  Patient accompanied by Father who provided some of the history, but he does not live with patient nor is he familiar with GYN history. There was mention of vaginal bleeding couple months ago where patient requested pads. Could not clarify if patient is post menopausal. Could be bleed from hemorrhoids while on blood thinners, but d/t the fact patient nor father could clearly provide GYN history, concern endometrial cancer until proven otherwise. Detailed plan below:   Loan was seen today for follow-up and med refill.     Diagnoses and all orders for this visit:  Acute deep vein thrombosis (DVT) of left lower extremity, unspecified vein (Primary)  -     Refilled apixaban (Eliquis) 5 mg tablet; Take 1 tablet (5 mg) by mouth 2 times a day.  Postmenopausal vaginal bleeding  -    Menopausal? Unsure  -    other ddx considered endometrial cancer, fibroids, cervical polyp, or hemorroid  -    Last PAP 5/2023 atypical cells with negative HPV, repeat in 3 yrs  -   " However, IO pap difficult to perform given hemiparesis & needed 3 healthcare providers to perform  -   Referral to Obstetrics; Future to evaluate and assess for further imaging and/or need for endometrial bx  - Called Father of patient and discussed referral to OBGYN. Provided verbal understanding and agreeable to plan  Chronic ischemic left MCA stroke  -     Discontinue: atorvastatin (Lipitor) 80 mg tablet; Take 1 tablet (80 mg) by mouth once daily.  -     atorvastatin (Lipitor) 80 mg tablet; Take 1 tablet (80 mg) by mouth once daily.  Mixed hyperlipidemia  Encounter for screening mammogram for malignant neoplasm of breast  -     BI mammo bilateral screening tomosynthesis; Future  Other orders  -     Follow Up In Primary Care; Future      RTC in 1-2 months for Medicare Wellness Exam and to establish with new PCP      Discussed with Dr. Narciso Vázquez, DO FM PGY3  Cary Vázquez,   PGY3

## 2025-06-12 ENCOUNTER — APPOINTMENT (OUTPATIENT)
Dept: UROLOGY | Facility: CLINIC | Age: 55
End: 2025-06-12
Payer: COMMERCIAL

## 2025-07-16 ENCOUNTER — HOSPITAL ENCOUNTER (OUTPATIENT)
Dept: RADIOLOGY | Facility: CLINIC | Age: 55
Discharge: HOME | End: 2025-07-16
Payer: COMMERCIAL

## 2025-07-16 VITALS — WEIGHT: 155 LBS | HEIGHT: 66 IN | BODY MASS INDEX: 24.91 KG/M2

## 2025-07-16 DIAGNOSIS — Z12.31 ENCOUNTER FOR SCREENING MAMMOGRAM FOR MALIGNANT NEOPLASM OF BREAST: ICD-10-CM

## 2025-07-16 PROCEDURE — 77063 BREAST TOMOSYNTHESIS BI: CPT

## 2025-07-16 PROCEDURE — 77067 SCR MAMMO BI INCL CAD: CPT | Performed by: RADIOLOGY

## 2025-07-16 PROCEDURE — 77063 BREAST TOMOSYNTHESIS BI: CPT | Performed by: RADIOLOGY

## 2025-07-18 DIAGNOSIS — G81.11 SPASTIC HEMIPARESIS OF RIGHT DOMINANT SIDE (MULTI): ICD-10-CM

## 2025-07-18 RX ORDER — BACLOFEN 10 MG/1
20 TABLET ORAL 3 TIMES DAILY
Qty: 540 TABLET | Refills: 1 | Status: SHIPPED | OUTPATIENT
Start: 2025-07-18 | End: 2026-01-14

## 2025-07-23 ENCOUNTER — APPOINTMENT (OUTPATIENT)
Dept: UROLOGY | Facility: CLINIC | Age: 55
End: 2025-07-23
Payer: COMMERCIAL

## 2025-08-29 ENCOUNTER — APPOINTMENT (OUTPATIENT)
Dept: UROLOGY | Facility: CLINIC | Age: 55
End: 2025-08-29
Payer: COMMERCIAL

## 2025-08-29 VITALS — TEMPERATURE: 97.3 F

## 2025-08-29 DIAGNOSIS — R33.9 URINARY RETENTION: Primary | ICD-10-CM

## 2025-08-29 PROCEDURE — 99213 OFFICE O/P EST LOW 20 MIN: CPT | Performed by: NURSE PRACTITIONER

## 2025-08-29 PROCEDURE — 51798 US URINE CAPACITY MEASURE: CPT | Performed by: NURSE PRACTITIONER

## 2025-08-29 PROCEDURE — 1036F TOBACCO NON-USER: CPT | Performed by: NURSE PRACTITIONER

## 2025-08-29 PROCEDURE — G2211 COMPLEX E/M VISIT ADD ON: HCPCS | Performed by: NURSE PRACTITIONER

## 2025-08-29 ASSESSMENT — PAIN SCALES - GENERAL: PAINLEVEL_OUTOF10: 0-NO PAIN

## 2026-09-01 ENCOUNTER — APPOINTMENT (OUTPATIENT)
Dept: UROLOGY | Facility: CLINIC | Age: 56
End: 2026-09-01
Payer: COMMERCIAL